# Patient Record
Sex: FEMALE | Race: WHITE | Employment: OTHER | URBAN - METROPOLITAN AREA
[De-identification: names, ages, dates, MRNs, and addresses within clinical notes are randomized per-mention and may not be internally consistent; named-entity substitution may affect disease eponyms.]

---

## 2018-07-20 ENCOUNTER — OFFICE VISIT (OUTPATIENT)
Dept: CARDIOLOGY CLINIC | Age: 83
End: 2018-07-20

## 2018-07-20 VITALS
HEIGHT: 65 IN | RESPIRATION RATE: 18 BRPM | BODY MASS INDEX: 23.82 KG/M2 | OXYGEN SATURATION: 93 % | HEART RATE: 60 BPM | DIASTOLIC BLOOD PRESSURE: 60 MMHG | SYSTOLIC BLOOD PRESSURE: 122 MMHG | WEIGHT: 143 LBS

## 2018-07-20 DIAGNOSIS — I42.2 HYPERTROPHIC CARDIOMYOPATHY (HCC): ICD-10-CM

## 2018-07-20 DIAGNOSIS — E78.00 HYPERCHOLESTEROLEMIA: ICD-10-CM

## 2018-07-20 DIAGNOSIS — R07.9 EXERTIONAL CHEST PAIN: Primary | ICD-10-CM

## 2018-07-20 DIAGNOSIS — R07.9 CHEST PAIN, UNSPECIFIED TYPE: ICD-10-CM

## 2018-07-20 RX ORDER — DILTIAZEM HYDROCHLORIDE 120 MG/1
120 CAPSULE, COATED, EXTENDED RELEASE ORAL DAILY
Qty: 90 CAP | Refills: 1 | Status: SHIPPED | OUTPATIENT
Start: 2018-07-20 | End: 2018-08-09 | Stop reason: SDUPTHER

## 2018-07-20 RX ORDER — DILTIAZEM HYDROCHLORIDE 30 MG/1
30 TABLET, FILM COATED ORAL 2 TIMES DAILY
COMMUNITY
Start: 2018-05-10 | End: 2018-07-20 | Stop reason: ALTCHOICE

## 2018-07-20 RX ORDER — LANOLIN ALCOHOL/MO/W.PET/CERES
400 CREAM (GRAM) TOPICAL DAILY
COMMUNITY
End: 2018-11-26

## 2018-07-20 RX ORDER — ATORVASTATIN CALCIUM 40 MG/1
TABLET, FILM COATED ORAL DAILY
COMMUNITY

## 2018-07-20 RX ORDER — ASPIRIN 81 MG/1
81 TABLET ORAL EVERY OTHER DAY
COMMUNITY

## 2018-07-20 RX ORDER — BISMUTH SUBSALICYLATE 262 MG
1 TABLET,CHEWABLE ORAL DAILY
COMMUNITY

## 2018-07-20 NOTE — PROGRESS NOTES
[address]  [phone] Subjective:  
  
Ramy Shirley is a 80 y.o. female is here for a new patient visit, formerly followed by Dr. Ethan Sierra. She was told she has hypertrophic cardiomyopathy. Dr. Ethan Sierra tried beta-blockers, but it caused multiple side effects as noted in the allergies section. She is currently on diltiazem with some improvement in symptoms, but still has chest pain with activities of daily living at home. She does some strength training type of exercises at home without any symptoms and can go to the store without chest pain. The patient orthopnea, PND, LE edema, palpitations, syncope, or presyncope. Patient Active Problem List  
 Diagnosis Date Noted  Chest pain 07/20/2018  Hypertrophic cardiomyopathy ([address] 07/20/2018  Elevated BP 05/25/2016  Decubitus ulcer of left heel, unstageable ([address] 03/16/2016  Orthostatic hypotension 08/24/2015  Pelvic fracture ([address] 08/22/2015  Breast cancer, stage 1, estrogen receptor positive ([address] 07/23/2014  Hypercholesterolemia  IBS (irritable bowel syndrome)  Hypothyroidism  Neuropathy Neal Helms MD 
Past Medical History:  
Diagnosis Date  Arrhythmia   
 pvc's  Blurred vision  Cancer ([address] BREAST  Coagulation disorder ([address] bleeds easily, and clots slowly.  Decubitus ulcer of left heel, unstageable ([address] 3/16/2016  Diabetes ([address]  GERD (gastroesophageal reflux disease)  Hypercholesterolemia  Hypothyroidism  IBS (irritable bowel syndrome)  Neuropathy Past Surgical History:  
Procedure Laterality Date  HX BLADDER SUSPENSION    
 HX CHOLECYSTECTOMY  HX HEMORRHOIDECTOMY  12/2012; 1/13  
 had two surgeries to repair.  HX ORTHOPAEDIC    
 hip Allergies Allergen Reactions  Adhesive Other (comments)  Amoxicillin Diarrhea Hemorrhoids, no apetite  Atenolol Other (comments) Noise in head and voice in body like a baby crying, seeing black spots  Bactrim [Sulfamethoprim Ds] Hives Family History Problem Relation Age of Onset  Heart Disease Sister  Heart Disease Brother  Cancer Father   
  lung  Cancer Daughter   
  breast  
  
Social History Social History  Marital status:  Spouse name: N/A  
 Number of children: N/A  
 Years of education: N/A Occupational History  Not on file. Social History Main Topics  Smoking status: Former Smoker Packs/day: 0.25 Years: 2.00  Smokeless tobacco: Never Used  Alcohol use No  
 Drug use: No  
 Sexual activity: Not Currently Other Topics Concern  Not on file Social History Narrative Current Outpatient Prescriptions Medication Sig  
 atorvastatin (LIPITOR) 40 mg tablet Take  by mouth daily.  multivitamin (ONE A DAY) tablet Take 1 Tab by mouth daily.  aspirin delayed-release 81 mg tablet Take 81 mg by mouth every other day.  lactase (LACTAID PO) Take  by mouth daily.  magnesium oxide (MAG-OX) 400 mg tablet Take 400 mg by mouth daily.  dilTIAZem CD (CARDIZEM CD) 120 mg ER capsule Take 1 Cap by mouth daily. Replaces short acting diltiazem 7/20/18  pantoprazole (PROTONIX) 40 mg tablet Take 40 mg by mouth daily.  LACTOBAC CMB #3/FOS/PANTETHINE (PROBIOTIC & ACIDOPHILUS PO) Take  by mouth daily.  gabapentin (NEURONTIN) 300 mg capsule TAKE ONE CAPSULE BY MOUTH FOUR TIMES DAILY  levothyroxine (SYNTHROID) 75 mcg tablet Take 1 Tab by mouth Daily (before breakfast).  omega-3 fatty acids-vitamin e (FISH OIL) 1,000 mg cap Take 2 Caps by mouth daily.  CINNAMON BARK (CINNAMON PO) Take 350 mg by mouth daily.  CALCIUM CARBONATE/VITAMIN D3 (CALCIUM WITH VITAMIN D3 PO) Take 1,000 mg by mouth three (3) times daily.  coenzyme q10-vitamin e (COQ10 ) 100-100 mg-unit Cap Take 100 mg by mouth two (2) times a day.   
 vitamin c-vitamin e cap Take by mouth.  CRESTOR 20 mg tablet TAKE 1 TABLET BY MOUTH EVERY AT BEDTIME.  PSYLLIUM HUSK (WAL-MUCIL FIBER PO) Take  by mouth. 6 caps daily No current facility-administered medications for this visit. Review of Symptoms: 
11 systems reviewed, negative other than as stated in the HPI Physical ExamPhysical Exam:   
Vitals:  
 07/20/18 1341 07/20/18 1354 BP: 126/60 122/60 Pulse: 60 Resp: 18 SpO2: 93% Weight: 143 lb (64.9 kg) Height: 5' 5\" (1.651 m) Body mass index is 23.8 kg/(m^2). General PE Gen:  NAD Mental Status - Alert. General Appearance - Not in acute distress. Chest and Lung Exam  
Inspection: Accessory muscles - No use of accessory muscles in breathing. Auscultation:  
Breath sounds: - Normal.  
Cardiovascular Inspection: Jugular vein - Bilateral - Inspection Normal.  
Palpation/Percussion:  
Apical Impulse: - Normal.  
Auscultation: Rhythm - Regular. Heart Sounds - S1 WNL and S2 WNL. No S3 or S4. Murmurs & Other Heart Sounds: Auscultation of the heart reveals - No Murmurs. Peripheral Vascular Upper Extremity: Inspection - Bilateral - No Cyanotic nailbeds or Digital clubbing. Lower Extremity:  
Palpation: Edema - Bilateral - No edema. Abdomen:   Soft, non-tender, bowel sounds are active. Neuro: A&O times 3, CN and motor grossly WNL Labs:  
Lab Results Component Value Date/Time  Cholesterol, total 118 10/09/2015 02:27 PM  
 Cholesterol, total 146 06/09/2015 11:24 AM  
 Cholesterol, total 143 02/28/2014 11:30 AM  
 Cholesterol, total 162 10/23/2013 11:14 AM  
 Cholesterol, total 127 02/05/2013 11:22 AM  
 HDL Cholesterol 35 (L) 10/09/2015 02:27 PM  
 HDL Cholesterol 34 (L) 06/09/2015 11:24 AM  
 HDL Cholesterol 37 (L) 02/28/2014 11:30 AM  
 HDL Cholesterol 35 (L) 10/23/2013 11:14 AM  
 HDL Cholesterol 32 (L) 02/05/2013 11:22 AM  
 LDL, calculated 38 10/09/2015 02:27 PM  
 LDL, calculated 43 06/09/2015 11:24 AM  
 LDL, calculated 46 02/28/2014 11:30 AM  
 LDL, calculated Comment 10/23/2013 11:14 AM  
 LDL, calculated 46 02/05/2013 11:22 AM  
 Triglyceride 227 (H) 10/09/2015 02:27 PM  
 Triglyceride 347 (H) 06/09/2015 11:24 AM  
 Triglyceride 301 (H) 02/28/2014 11:30 AM  
 Triglyceride 419 (H) 10/23/2013 11:14 AM  
 Triglyceride 243 (H) 02/05/2013 11:22 AM  
 CHOL/HDL Ratio 4.8 06/09/2010 05:20 PM  
 
Lab Results Component Value Date/Time CK 89 08/22/2015 04:25 PM  
 
Lab Results Component Value Date/Time Sodium 142 10/09/2015 02:27 PM  
 Potassium 4.6 10/09/2015 02:27 PM  
 Chloride 105 10/09/2015 02:27 PM  
 CO2 19 10/09/2015 02:27 PM  
 Anion gap 9 08/25/2015 03:19 AM  
 Glucose 95 10/09/2015 02:27 PM  
 BUN 14 10/09/2015 02:27 PM  
 Creatinine 0.88 10/09/2015 02:27 PM  
 BUN/Creatinine ratio 16 10/09/2015 02:27 PM  
 GFR est AA 65 10/09/2015 02:27 PM  
 GFR est non-AA 56 (L) 10/09/2015 02:27 PM  
 Calcium 9.8 10/09/2015 02:27 PM  
 Bilirubin, total 0.3 10/09/2015 02:27 PM  
 AST (SGOT) 31 10/09/2015 02:27 PM  
 Alk. phosphatase 110 10/09/2015 02:27 PM  
 Protein, total 6.9 10/09/2015 02:27 PM  
 Albumin 3.8 10/09/2015 02:27 PM  
 Globulin 4.2 (H) 08/22/2015 04:25 PM  
 A-G Ratio 0.8 (L) 08/22/2015 04:25 PM  
 ALT (SGPT) 18 10/09/2015 02:27 PM  
 
 
EKG: 
NSR, LVH with prominent ST and T-wave changes Assessment: 
  
  
1. Exertional chest pain 2. Chest pain, unspecified type 3. Hypercholesterolemia 4. Hypertrophic cardiomyopathy ([address] Orders Placed This Encounter  AMB POC EKG ROUTINE W/ 12 LEADS, INTER & REP Order Specific Question:   Reason for Exam: Answer:   routine  STRESS TEST LEXISCAN/CARDIOLITE Standing Status:   Future Standing Expiration Date:   1/20/2019 Order Specific Question:   Reason for Exam: Answer:   CP and/or SOB  2D ECHO COMPLETE ADULT (TTE) W OR WO CONTR Standing Status:   Future Standing Expiration Date:   1/17/2019   Order Specific Question:   Reason for Exam: Answer:   HF  
 atorvastatin (LIPITOR) 40 mg tablet Sig: Take  by mouth daily.  multivitamin (ONE A DAY) tablet Sig: Take 1 Tab by mouth daily.  DISCONTD: dilTIAZem (CARDIZEM) 30 mg tablet Sig: Take 30 mg by mouth two (2) times a day.  aspirin delayed-release 81 mg tablet Sig: Take 81 mg by mouth every other day.  lactase (LACTAID PO) Sig: Take  by mouth daily.  magnesium oxide (MAG-OX) 400 mg tablet Sig: Take 400 mg by mouth daily.  dilTIAZem CD (CARDIZEM CD) 120 mg ER capsule Sig: Take 1 Cap by mouth daily. Replaces short acting diltiazem 7/20/18 Dispense:  90 Cap Refill:  1 Plan:  
 
Reported hypertrophic cardiomyopathy with chest pain on exertion: 
Intolerant of beta-blockers. Diltiazem seems to help a bit. Try long-acting diltiazem at a higher dose. Check echo and Lexiscan Myoview. Was prescribed isosorbide mononitrate, but decided not to take based on the side effects she read about. Consider Ranexa. She is leaning toward medical management only at the age of 80, but would possibly consider heart catheterization if her stress test were significantly abnormal. 
 
Follow-up next available.  
 
Alvin Cormier MD

## 2018-07-20 NOTE — PROGRESS NOTES
1. Have you been to the ER, urgent care clinic since your last visit? Hospitalized since your last visit? No    2. Have you seen or consulted any other health care providers outside of the [address] [place] [address] since your last visit? Include any pap smears or colon screening. No    Chief Complaint   Patient presents with    Chest Pain     Last seen here 2014. C/O CP with exertion.

## 2018-08-03 ENCOUNTER — CLINICAL SUPPORT (OUTPATIENT)
Dept: CARDIOLOGY CLINIC | Age: 83
End: 2018-08-03

## 2018-08-03 DIAGNOSIS — R07.9 CHEST PAIN, UNSPECIFIED TYPE: ICD-10-CM

## 2018-08-03 DIAGNOSIS — E78.00 HYPERCHOLESTEROLEMIA: ICD-10-CM

## 2018-08-03 DIAGNOSIS — R93.1 ABNORMAL NUCLEAR CARDIAC IMAGING TEST: Primary | ICD-10-CM

## 2018-08-03 DIAGNOSIS — R07.9 EXERTIONAL CHEST PAIN: ICD-10-CM

## 2018-08-03 DIAGNOSIS — I42.2 HYPERTROPHIC CARDIOMYOPATHY (HCC): ICD-10-CM

## 2018-08-07 ENCOUNTER — TELEPHONE (OUTPATIENT)
Dept: CARDIOLOGY CLINIC | Age: 83
End: 2018-08-07

## 2018-08-07 NOTE — TELEPHONE ENCOUNTER
----- Message from Radha Rice MD sent at 8/6/2018  8:26 AM EDT -----  Stress test abnormal. F/u to discuss as Dr. Aron Hess is away.  thx.

## 2018-08-07 NOTE — TELEPHONE ENCOUNTER
Please call to scheduled with NP Dania Stratton or other provider ASAP per Dr Desmond Mcdonnell thanks

## 2018-08-08 NOTE — PROGRESS NOTES
Normal pumping heart strength. Known HOCM. Moderate valve problems. Can be discussed during her appointment.

## 2018-08-09 ENCOUNTER — OFFICE VISIT (OUTPATIENT)
Dept: CARDIOLOGY CLINIC | Age: 83
End: 2018-08-09

## 2018-08-09 VITALS
WEIGHT: 144.5 LBS | DIASTOLIC BLOOD PRESSURE: 72 MMHG | SYSTOLIC BLOOD PRESSURE: 126 MMHG | RESPIRATION RATE: 18 BRPM | BODY MASS INDEX: 24.07 KG/M2 | HEIGHT: 65 IN | OXYGEN SATURATION: 97 % | HEART RATE: 120 BPM

## 2018-08-09 DIAGNOSIS — I42.2 HYPERTROPHIC CARDIOMYOPATHY (HCC): Primary | ICD-10-CM

## 2018-08-09 DIAGNOSIS — E78.00 HYPERCHOLESTEROLEMIA: ICD-10-CM

## 2018-08-09 RX ORDER — DILTIAZEM HYDROCHLORIDE 180 MG/1
180 CAPSULE, COATED, EXTENDED RELEASE ORAL DAILY
Qty: 30 CAP | Refills: 1 | Status: SHIPPED | OUTPATIENT
Start: 2018-08-09 | End: 2018-08-21 | Stop reason: ALTCHOICE

## 2018-08-09 NOTE — MR AVS SNAPSHOT
[address] [facility] 
[phone] Patient: Josie Lloyd MRN:  [id number] Visit Information Date & Time Provider Department Dept. Phone Encounter #  
 8/9/2018 11:30 AM Eugenia Silva NP [facility] [phone] Your Appointments 9/17/2018  3:00 PM  
ESTABLISHED PATIENT with Av Cisneros MD  
[facility] [address] [place] [address]) Appt Note: $0CP 7/20/18ksr / test results; Per Rylee Serrano, one month f/u with Dr. Kiera Santizo [address]  
[phone] [address] Upcoming Health Maintenance Date Due  
 FOOT EXAM Q1 4/2/1931 DTaP/Tdap/Td series (1 - Tdap) 4/2/1942 ZOSTER VACCINE AGE 60> 2/2/1981 Bone Densitometry (Dexa) Screening 4/2/1986 Pneumococcal 65+ High/Highest Risk (1 of 2 - PCV13) 4/2/1986 HEMOGLOBIN A1C Q6M 4/9/2016 MICROALBUMIN Q1 6/9/2016 EYE EXAM RETINAL OR DILATED Q1 8/6/2016 LIPID PANEL Q1 10/9/2016 GLAUCOMA SCREENING Q2Y 8/6/2017 MEDICARE YEARLY EXAM 3/14/2018 Influenza Age 5 to Adult 8/1/2018 Allergies as of 8/9/2018  Review Complete On: 8/9/2018 By: Maxwell Stapleton LPN Severity Noted Reaction Type Reactions Adhesive High 08/04/2014   Intolerance Other (comments) Amoxicillin High 04/29/2011    Diarrhea Hemorrhoids, no apetite Atenolol  07/20/2018    Other (comments) Noise in head and voice in body like a baby crying, seeing black spots Bactrim [Sulfamethoprim Ds]  03/16/2016    Hives Current Immunizations  Reviewed on 8/27/2015 Name Date Influenza Vaccine 10/9/2015 Not reviewed this visit You Were Diagnosed With   
  
 Codes Comments Hypercholesterolemia    -  Primary ICD-10-CM: E78.00 ICD-9-CM: 272.0 Vitals BP Pulse Resp Height(growth percentile) Weight(growth percentile) SpO2  
 126/72 (BP 1 Location: Right arm, BP Patient Position: Sitting) (!) 120 18 5' 5\" (1.651 m) 144 lb 8 oz (65.5 kg) 97% BMI OB Status Smoking Status 24.05 kg/m2 Postmenopausal Former Smoker Vitals History BMI and BSA Data Body Mass Index Body Surface Area 24.05 kg/m 2 1.73 m 2 Preferred Pharmacy Pharmacy Name Phone [facility] [phone] [place], [address] AT [place] [address] [phone] Your Updated Medication List  
  
   
This list is accurate as of 8/9/18 12:11 PM.  Always use your most recent med list.  
  
  
  
  
 aspirin delayed-release 81 mg tablet Take 81 mg by mouth every other day. atorvastatin 40 mg tablet Commonly known as:  LIPITOR Take  by mouth daily. CALCIUM WITH VITAMIN D3 PO Take 1,000 mg by mouth Three (3) times a week. CINNAMON PO Take 350 mg by mouth daily. COQ10  100-100 mg-unit Cap Generic drug:  coenzyme q10-vitamin e Take 100 mg by mouth two (2) times a day. dilTIAZem  mg ER capsule Commonly known as:  CARDIZEM CD Take 1 Cap by mouth daily. Replaces short acting diltiazem 7/20/18 FISH OIL 1,000 mg Cap Generic drug:  omega-3 fatty acids-vitamin e Take 2 Caps by mouth daily. gabapentin 300 mg capsule Commonly known as:  NEURONTIN  
TAKE ONE CAPSULE BY MOUTH FOUR TIMES DAILY  
  
 LACTAID PO Take  by mouth as needed. levothyroxine 75 mcg tablet Commonly known as:  SYNTHROID Take 1 Tab by mouth Daily (before breakfast). magnesium oxide 400 mg tablet Commonly known as:  MAG-OX Take 400 mg by mouth daily. multivitamin tablet Commonly known as:  ONE A DAY Take 1 Tab by mouth daily. pantoprazole 40 mg tablet Commonly known as:  PROTONIX Take 40 mg by mouth daily.   
  
 PROBIOTIC & ACIDOPHILUS PO  
 Take  by mouth daily. vitamin c-vitamin e Cap Take  by mouth. WAL-MUCIL FIBER PO Take  by mouth. 6 caps daily We Performed the Following AMB POC EKG ROUTINE W/ 12 LEADS, INTER & REP [47364 CPT(R)] Introducing [facility] SERVICES! [employer] introduces [employer] patient portal. Now you can access parts of your medical record, email your doctor's office, and request medication refills online. 1. In your internet browser, go to [email or web address] 2. Click on the First Time User? Click Here link in the Sign In box. You will see the New Member Sign Up page. 3. Enter your [employer] Access Code exactly as it appears below. You will not need to use this code after youve completed the sign-up process. If you do not sign up before the expiration date, you must request a new code. · [employer] Access Code: [email or web address] Expires: 10/18/2018  1:37 PM 
 
4. Enter the last four digits of your Social Security Number (xxxx) and Date of Birth (mm/dd/yyyy) as indicated and click Submit. You will be taken to the next sign-up page. 5. Create a [employer] ID. This will be your [employer] login ID and cannot be changed, so think of one that is secure and easy to remember. 6. Create a [employer] password. You can change your password at any time. 7. Enter your Password Reset Question and Answer. This can be used at a later time if you forget your password. 8. Enter your e-mail address. You will receive e-mail notification when new information is available in [address] 9. Click Sign Up. You can now view and download portions of your medical record. 10. Click the Download Summary menu link to download a portable copy of your medical information. If you have questions, please visit the Frequently Asked Questions section of the [employer] website. Remember, [employer] is NOT to be used for urgent needs. For medical emergencies, dial 911. Now available from your iPhone and Android! Please provide this summary of care documentation to your next provider. Your primary care clinician is listed as Ankur Dowd. If you have any questions after today's visit, please call [phone].

## 2018-08-09 NOTE — LETTER
8/9/2018 12:46 PM 
 
Patient:  Melanie Dick YOB: 1921 Date of Visit: 8/9/2018 Dear Desean Rhodes MD 
[place] [address] VIA Facsimile: [phone] 
 : Thank you for referring Ms. Earl Stephens to me for evaluation/treatment. Below are the relevant portions of my assessment and plan of care. If you have questions, please do not hesitate to call me. I look forward to following Ms. Katheren Dakins along with you. Sincerely, Pancho Carreno NP

## 2018-08-09 NOTE — PROGRESS NOTES
Ann-Marie Mendoza DNP, ANP-BC  Subjective/HPI:     Xavier Pickett is a 80 y.o. female is here for test results follow-up. She has a history of HOCM. She is here to review her echocardiogram and stress test results. Patient is accompanied by HER-2 daughters. Patient denies exertional chest pain or limiting dyspnea on exertion, she reports in the morning when she is getting washed and dressed if she does not take her time she feels fatigued however she is able to walk with a rolling walker and perform her daily exercise routine which involves a floor peddling device without symptoms. At last appointment she was changed from immediate release to extended release diltiazem. Nuclear stress test:    Impression:   Myocardial perfusion imaging is abnormal: there is a large area of infarction in the infero-lateral wall. Overall left ventricular systolic function was abnormal: with regional wall motion abnormalities (as noted above). No previous study to compare. These test results indicate high likelihood for the presence of angiographically significant coronary artery disease.         Echocardiogram:  SUMMARY:  Left ventricle: The cavity was small. Systolic function was vigorous. Ejection fraction was estimated in the range of 65 % to 70 %. Wall  thickness was markedly increased. There was severe asymmetric hypertrophy. There was dynamic obstruction in the outflow tract, with a peak gradient  of 140 mmHg. Left atrium: The atrium was mildly dilated. Mitral valve: There was moderate annular calcification. There was moderate  regurgitation. Aortic valve: Leaflets exhibited sclerosis    PCP Provider  Ina Serrano MD  Past Medical History:   Diagnosis Date    Arrhythmia     pvc's    Blurred vision     Cancer ([address]     BREAST    Coagulation disorder (HCC)     bleeds easily, and clots slowly.     Decubitus ulcer of left heel, unstageable ([address] 3/16/2016    Diabetes ([address]     GERD (gastroesophageal reflux disease)     Hypercholesterolemia     Hypothyroidism     IBS (irritable bowel syndrome)     Neuropathy       Past Surgical History:   Procedure Laterality Date    HX BLADDER SUSPENSION      HX CHOLECYSTECTOMY      HX HEMORRHOIDECTOMY  12/2012; 1/13    had two surgeries to repair.  HX ORTHOPAEDIC      hip     Allergies   Allergen Reactions    Adhesive Other (comments)    Amoxicillin Diarrhea     Hemorrhoids, no apetite    Atenolol Other (comments)     Noise in head and voice in body like a baby crying, seeing black spots    Bactrim [Sulfamethoprim Ds] Hives      Family History   Problem Relation Age of Onset    Heart Disease Sister     Heart Disease Brother     Cancer Father      lung    Cancer Daughter      breast      Current Outpatient Prescriptions   Medication Sig    dilTIAZem CD (CARDIZEM CD) 180 mg ER capsule Take 1 Cap by mouth daily. Increased 8/9/18    atorvastatin (LIPITOR) 40 mg tablet Take  by mouth daily.  multivitamin (ONE A DAY) tablet Take 1 Tab by mouth daily.  aspirin delayed-release 81 mg tablet Take 81 mg by mouth every other day.  lactase (LACTAID PO) Take  by mouth as needed.  magnesium oxide (MAG-OX) 400 mg tablet Take 400 mg by mouth daily.  pantoprazole (PROTONIX) 40 mg tablet Take 40 mg by mouth daily.  LACTOBAC CMB #3/FOS/PANTETHINE (PROBIOTIC & ACIDOPHILUS PO) Take  by mouth daily.  gabapentin (NEURONTIN) 300 mg capsule TAKE ONE CAPSULE BY MOUTH FOUR TIMES DAILY    levothyroxine (SYNTHROID) 75 mcg tablet Take 1 Tab by mouth Daily (before breakfast).  omega-3 fatty acids-vitamin e (FISH OIL) 1,000 mg cap Take 2 Caps by mouth daily.  CINNAMON BARK (CINNAMON PO) Take 350 mg by mouth daily.  CALCIUM CARBONATE/VITAMIN D3 (CALCIUM WITH VITAMIN D3 PO) Take 1,000 mg by mouth Three (3) times a week.  coenzyme q10-vitamin e (COQ10 ) 100-100 mg-unit Cap Take 100 mg by mouth two (2) times a day.     vitamin c-vitamin e cap Take  by mouth.  PSYLLIUM HUSK (WAL-MUCIL FIBER PO) Take  by mouth. 6 caps daily     No current facility-administered medications for this visit. Vitals:    08/09/18 1116 08/09/18 1129   BP: 130/70 126/72   Pulse: (!) 120    Resp: 18    SpO2: 97%    Weight: 144 lb 8 oz (65.5 kg)    Height: 5' 5\" (1.651 m)      Social History     Social History    Marital status:      Spouse name: N/A    Number of children: N/A    Years of education: N/A     Occupational History    Not on file. Social History Main Topics    Smoking status: Former Smoker     Packs/day: 0.25     Years: 2.00    Smokeless tobacco: Never Used    Alcohol use No    Drug use: No    Sexual activity: Not Currently     Other Topics Concern    Not on file     Social History Narrative       I have reviewed the nurses notes, vitals, problem list, allergy list, medical history, family, social history and medications. Review of Symptoms:    General: Pt denies excessive weight gain or loss. Pt is able to conduct ADL's  HEENT: Denies blurred vision, headaches, epistaxis and difficulty swallowing. Respiratory: Denies shortness of breath, HICKEY, wheezing or stridor. Cardiovascular: Denies precordial pain, palpitations, edema or PND  Gastrointestinal: Denies poor appetite, indigestion, abdominal pain or blood in stool  Musculoskeletal: Denies pain or swelling from muscles or joints  Neurologic: Denies tremor, paresthesias, or sensory motor disturbance  Skin: Denies rash, itching or texture change. Physical Exam:      General: Well developed, in no acute distress, cooperative and alert  HEENT: No carotid bruits, no JVD, trach is midline. Neck Supple,   Heart:  Normal S1/S2 negative S3 or S4. Regular, 2/6 holosystolic murmur  Respiratory: Clear bilaterally x 4, no wheezing or rales  Abdomen:   Soft, non-tender, no masses, bowel sounds are active.   Extremities:  No edema, normal cap refill, no cyanosis, atraumatic.    Neuro: A&Ox3, speech clear, gait stable. Skin: Skin color is normal. No rashes or lesions. Non diaphoretic  Vascular: 2+ pulses symmetric in all extremities    Cardiographics    ECG: Sinus tachycardia L VH criteria  Results for orders placed or performed during the hospital encounter of 08/22/15   EKG, 12 LEAD, INITIAL   Result Value Ref Range    Ventricular Rate 83 BPM    Atrial Rate 83 BPM    P-R Interval 172 ms    QRS Duration 78 ms    Q-T Interval 370 ms    QTC Calculation (Bezet) 434 ms    Calculated P Axis 59 degrees    Calculated R Axis 43 degrees    Calculated T Axis 155 degrees    Diagnosis       Normal sinus rhythm  Left ventricular hypertrophy with repolarization abnormality  When compared with ECG of 16-JUN-2015 10:19,  No significant change was found Except  for rate    Confirmed by Loreta Sanabria ([id number]) on 8/23/2015 3:23:36 PM           Cardiology Labs:  Lab Results   Component Value Date/Time    Cholesterol, total 118 10/09/2015 02:27 PM    HDL Cholesterol 35 (L) 10/09/2015 02:27 PM    LDL, calculated 38 10/09/2015 02:27 PM    Triglyceride 227 (H) 10/09/2015 02:27 PM    CHOL/HDL Ratio 4.8 06/09/2010 05:20 PM       Lab Results   Component Value Date/Time    Sodium 142 10/09/2015 02:27 PM    Potassium 4.6 10/09/2015 02:27 PM    Chloride 105 10/09/2015 02:27 PM    CO2 19 10/09/2015 02:27 PM    Anion gap 9 08/25/2015 03:19 AM    Glucose 95 10/09/2015 02:27 PM    BUN 14 10/09/2015 02:27 PM    Creatinine 0.88 10/09/2015 02:27 PM    BUN/Creatinine ratio 16 10/09/2015 02:27 PM    GFR est AA 65 10/09/2015 02:27 PM    GFR est non-AA 56 (L) 10/09/2015 02:27 PM    Calcium 9.8 10/09/2015 02:27 PM    Bilirubin, total 0.3 10/09/2015 02:27 PM    AST (SGOT) 31 10/09/2015 02:27 PM    Alk.  phosphatase 110 10/09/2015 02:27 PM    Protein, total 6.9 10/09/2015 02:27 PM    Albumin 3.8 10/09/2015 02:27 PM    Globulin 4.2 (H) 08/22/2015 04:25 PM    A-G Ratio 0.8 (L) 08/22/2015 04:25 PM    ALT (SGPT) 18 10/09/2015 02:27 PM Assessment:     Assessment:     Diagnoses and all orders for this visit:    1. Hypertrophic cardiomyopathy ([address]    2. Hypercholesterolemia  -     AMB POC EKG ROUTINE W/ 12 LEADS, INTER & REP    Other orders  -     dilTIAZem CD (CARDIZEM CD) 180 mg ER capsule; Take 1 Cap by mouth daily. Increased 8/9/18        ICD-10-CM ICD-9-CM    1. Hypertrophic cardiomyopathy (HCC) I42.2 425.18    2. Hypercholesterolemia E78.00 272.0 AMB POC EKG ROUTINE W/ 12 LEADS, INTER & REP     Orders Placed This Encounter    AMB POC EKG ROUTINE W/ 12 LEADS, INTER & REP     Order Specific Question:   Reason for Exam:     Answer:   routine    dilTIAZem CD (CARDIZEM CD) 180 mg ER capsule     Sig: Take 1 Cap by mouth daily. Increased 8/9/18     Dispense:  30 Cap     Refill:  1        Plan:     Patient is a 60-year-old female who presents for review of test results. Echocardiogram shows normal pumping strength ejection fraction 65% of note nuclear stress test had falsely indicated at 35%. She has known HOCM her peak gradient is 140 mmHg. She presents tachycardic I will increase diltiazem from 120 mg to 180 mg daily. On nuclear stress test there is no reversible ischemia however there is a fixed defect. In reviewing hospital nuclear stress test in 2014 there was not a fixed defect. Ms. Yves Gracia at 80 is able to perform her activities of daily life without limiting dyspnea on exertion or chest pain. She states she exercises regularly, walks with a rolling walker without difficulty. We will have patient follow-up in 1 month, reassess EKG heart rate, reassess symptoms, no indication at this time for coronary angiography as she is asymptomatic of CAD. Follow-up with Dr. Evan Carrington in 1 month  Craven Cogan, NP    This note was created using voice recognition software. Despite editing, there may be syntax errors.

## 2018-08-09 NOTE — PROGRESS NOTES
1. Have you been to the ER, urgent care clinic since your last visit? Hospitalized since your last visit? NO    2. Have you seen or consulted any other health care providers outside of the [address] [place] [address] since your last visit? Include any pap smears or colon screening. NO    Results. C/O HEAVINESS IN CHEST OFF AND ON, SWELLING IN BLE.

## 2018-08-21 RX ORDER — DILTIAZEM HYDROCHLORIDE 60 MG/1
60 TABLET, FILM COATED ORAL 3 TIMES DAILY
Qty: 90 TAB | Refills: 3 | Status: SHIPPED | OUTPATIENT
Start: 2018-08-21 | End: 2019-01-04 | Stop reason: SDUPTHER

## 2018-11-26 ENCOUNTER — OFFICE VISIT (OUTPATIENT)
Dept: CARDIOLOGY CLINIC | Age: 83
End: 2018-11-26

## 2018-11-26 VITALS
HEIGHT: 65 IN | RESPIRATION RATE: 20 BRPM | OXYGEN SATURATION: 93 % | DIASTOLIC BLOOD PRESSURE: 70 MMHG | BODY MASS INDEX: 23.82 KG/M2 | WEIGHT: 143 LBS | SYSTOLIC BLOOD PRESSURE: 132 MMHG | HEART RATE: 117 BPM

## 2018-11-26 DIAGNOSIS — I42.2 HYPERTROPHIC CARDIOMYOPATHY (HCC): Primary | ICD-10-CM

## 2018-11-26 DIAGNOSIS — R07.89 OTHER CHEST PAIN: ICD-10-CM

## 2018-11-26 DIAGNOSIS — R06.09 DOE (DYSPNEA ON EXERTION): ICD-10-CM

## 2018-11-26 DIAGNOSIS — E78.00 HYPERCHOLESTEROLEMIA: ICD-10-CM

## 2018-11-26 RX ORDER — NITROGLYCERIN 0.4 MG/1
0.4 TABLET SUBLINGUAL
Qty: 25 TAB | Refills: 1 | Status: SHIPPED | OUTPATIENT
Start: 2018-11-26 | End: 2021-05-12

## 2018-11-26 RX ORDER — RANOLAZINE 500 MG/1
500 TABLET, EXTENDED RELEASE ORAL 2 TIMES DAILY
Qty: 60 TAB | Refills: 3 | Status: SHIPPED | OUTPATIENT
Start: 2018-11-26 | End: 2020-05-27 | Stop reason: SDUPTHER

## 2018-11-26 NOTE — PROGRESS NOTES
[address], [address] [facility]  [phone]     Subjective:      Cony Lopez is a 80 y.o. female is here for symptom based visit. Reports unchanged exertional chest pain associated with sob when she exerts around the house. It takes her 3 hrs go get dressed because upper body movement, more especially, cause her to chest pain to recur and she gets shortwinded. She states this is the same old story that has not and her symptoms always resolve immediately with rest.  Then she can go out and do her daily activities without any chest discomfort. The patient denies orthopnea, PND, LE edema, palpitations, syncope, or presyncope. Patient Active Problem List    Diagnosis Date Noted    HICKEY (dyspnea on exertion) 11/26/2018    Chest pain 07/20/2018    Hypertrophic cardiomyopathy ([address] 07/20/2018    Elevated BP 05/25/2016    Decubitus ulcer of left heel, unstageable ([address] 03/16/2016    Orthostatic hypotension 08/24/2015    Pelvic fracture (HCC) 08/22/2015    Breast cancer, stage 1, estrogen receptor positive ([address] 07/23/2014    Hypercholesterolemia     IBS (irritable bowel syndrome)     Hypothyroidism     Neuropathy       Basilio Romo MD  Past Medical History:   Diagnosis Date    Arrhythmia     pvc's    Blurred vision     Cancer ([address]     BREAST    Coagulation disorder (HCC)     bleeds easily, and clots slowly.  Decubitus ulcer of left heel, unstageable ([address] 3/16/2016    Diabetes ([address]     GERD (gastroesophageal reflux disease)     Hypercholesterolemia     Hypothyroidism     IBS (irritable bowel syndrome)     Neuropathy       Past Surgical History:   Procedure Laterality Date    HX BLADDER SUSPENSION      HX CHOLECYSTECTOMY      HX HEMORRHOIDECTOMY  12/2012; 1/13    had two surgeries to repair.     HX ORTHOPAEDIC      hip     Allergies   Allergen Reactions    Adhesive Other (comments)    Amoxicillin Diarrhea     Hemorrhoids, no apetite    Atenolol Other (comments)     Noise in head and voice in body like a baby crying, seeing black spots    Bactrim [Sulfamethoprim Ds] Hives    Imdur [Isosorbide Mononitrate] Other (comments)     Headache and nightmares      Family History   Problem Relation Age of Onset    Heart Disease Sister     Heart Disease Brother     Cancer Father         lung    Cancer Daughter         breast      Social History     Socioeconomic History    Marital status:      Spouse name: Not on file    Number of children: Not on file    Years of education: Not on file    Highest education level: Not on file   Social Needs    Financial resource strain: Not on file    Food insecurity - worry: Not on file    Food insecurity - inability: Not on file   Smash Bucket needs - medical: Not on file   Smash Bucket needs - non-medical: Not on file   Occupational History    Not on file   Tobacco Use    Smoking status: Former Smoker     Packs/day: 0.25     Years: 2.00     Pack years: 0.50    Smokeless tobacco: Never Used   Substance and Sexual Activity    Alcohol use: No    Drug use: No    Sexual activity: Not Currently   Other Topics Concern    Not on file   Social History Narrative    Not on file      Current Outpatient Medications   Medication Sig    ranolazine ER (RANEXA) 500 mg SR tablet Take 1 Tab by mouth two (2) times a day.  nitroglycerin (NITROSTAT) 0.4 mg SL tablet 1 Tab by SubLINGual route every five (5) minutes as needed (call 911 if not relieved by 3).  dilTIAZem (CARDIZEM) 60 mg tablet Take 1 Tab by mouth three (3) times daily. Intolerant to extended release.  atorvastatin (LIPITOR) 40 mg tablet Take  by mouth daily.  multivitamin (ONE A DAY) tablet Take 1 Tab by mouth daily.  aspirin delayed-release 81 mg tablet Take 81 mg by mouth every other day.  lactase (LACTAID PO) Take  by mouth as needed.  pantoprazole (PROTONIX) 40 mg tablet Take 40 mg by mouth daily.     LACTOBAC CMB #3/FOS/PANTETHINE (PROBIOTIC & ACIDOPHILUS PO) Take  by mouth daily.  gabapentin (NEURONTIN) 300 mg capsule TAKE ONE CAPSULE BY MOUTH FOUR TIMES DAILY    levothyroxine (SYNTHROID) 75 mcg tablet Take 1 Tab by mouth Daily (before breakfast).  PSYLLIUM HUSK (WAL-MUCIL FIBER PO) Take  by mouth as needed.  omega-3 fatty acids-vitamin e (FISH OIL) 1,000 mg cap Take 2 Caps by mouth daily.  CINNAMON BARK (CINNAMON PO) Take 350 mg by mouth daily.  CALCIUM CARBONATE/VITAMIN D3 (CALCIUM WITH VITAMIN D3 PO) Take 1,000 mg by mouth Three (3) times a week.  coenzyme q10-vitamin e (COQ10 ) 100-100 mg-unit Cap Take 100 mg by mouth two (2) times a day. No current facility-administered medications for this visit. Review of Symptoms:  11 systems reviewed, negative other than as stated in the HPI    Physical ExamPhysical Exam:    Vitals:    11/26/18 1501 11/26/18 1502 11/26/18 1547 11/26/18 1549   BP: 160/70 160/70 134/72 132/70   Pulse: (!) 117      Resp: 20      SpO2: 93%      Weight: 143 lb (64.9 kg)      Height: 5' 5\" (1.651 m)        Body mass index is 23.8 kg/m². General PE   Gen:  NAD  Mental Status - Alert. General Appearance - Not in acute distress. Chest and Lung Exam   Inspection: Accessory muscles - No use of accessory muscles in breathing. Auscultation:   Breath sounds: - Normal.   Cardiovascular   Inspection: Jugular vein - Bilateral - Inspection Normal.   Palpation/Percussion:   Apical Impulse: - Normal.   Auscultation: Rhythm - Regular. Heart Sounds - S1 WNL and S2 WNL. No S3 or S4. Murmurs & Other Heart Sounds: Auscultation of the heart reveals - 3/6 MAKEDA  Peripheral Vascular   Upper Extremity: Inspection - Bilateral - No Cyanotic nailbeds or Digital clubbing. Lower Extremity:   Palpation: Edema - Bilateral - No edema. Abdomen:   Soft, non-tender, bowel sounds are active.   Neuro: A&O times 3, CN and motor grossly WNL    Labs:   Lab Results   Component Value Date/Time Cholesterol, total 118 10/09/2015 02:27 PM    Cholesterol, total 146 06/09/2015 11:24 AM    Cholesterol, total 143 02/28/2014 11:30 AM    Cholesterol, total 162 10/23/2013 11:14 AM    Cholesterol, total 127 02/05/2013 11:22 AM    HDL Cholesterol 35 (L) 10/09/2015 02:27 PM    HDL Cholesterol 34 (L) 06/09/2015 11:24 AM    HDL Cholesterol 37 (L) 02/28/2014 11:30 AM    HDL Cholesterol 35 (L) 10/23/2013 11:14 AM    HDL Cholesterol 32 (L) 02/05/2013 11:22 AM    LDL, calculated 38 10/09/2015 02:27 PM    LDL, calculated 43 06/09/2015 11:24 AM    LDL, calculated 46 02/28/2014 11:30 AM    LDL, calculated Comment 10/23/2013 11:14 AM    LDL, calculated 46 02/05/2013 11:22 AM    Triglyceride 227 (H) 10/09/2015 02:27 PM    Triglyceride 347 (H) 06/09/2015 11:24 AM    Triglyceride 301 (H) 02/28/2014 11:30 AM    Triglyceride 419 (H) 10/23/2013 11:14 AM    Triglyceride 243 (H) 02/05/2013 11:22 AM    CHOL/HDL Ratio 4.8 06/09/2010 05:20 PM     Lab Results   Component Value Date/Time    CK 89 08/22/2015 04:25 PM     Lab Results   Component Value Date/Time    Sodium 142 10/09/2015 02:27 PM    Potassium 4.6 10/09/2015 02:27 PM    Chloride 105 10/09/2015 02:27 PM    CO2 19 10/09/2015 02:27 PM    Anion gap 9 08/25/2015 03:19 AM    Glucose 95 10/09/2015 02:27 PM    BUN 14 10/09/2015 02:27 PM    Creatinine 0.88 10/09/2015 02:27 PM    BUN/Creatinine ratio 16 10/09/2015 02:27 PM    GFR est AA 65 10/09/2015 02:27 PM    GFR est non-AA 56 (L) 10/09/2015 02:27 PM    Calcium 9.8 10/09/2015 02:27 PM    Bilirubin, total 0.3 10/09/2015 02:27 PM    AST (SGOT) 31 10/09/2015 02:27 PM    Alk. phosphatase 110 10/09/2015 02:27 PM    Protein, total 6.9 10/09/2015 02:27 PM    Albumin 3.8 10/09/2015 02:27 PM    Globulin 4.2 (H) 08/22/2015 04:25 PM    A-G Ratio 0.8 (L) 08/22/2015 04:25 PM    ALT (SGPT) 18 10/09/2015 02:27 PM       EKG:       Assessment:     Assessment:      1. Hypertrophic cardiomyopathy ([address]    2. Hypercholesterolemia    3.  Other chest pain 4. HICKEY (dyspnea on exertion)        Orders Placed This Encounter    AMB POC EKG ROUTINE W/ 12 LEADS, INTER & REP     Order Specific Question:   Reason for Exam:     Answer:   routine    ranolazine ER (RANEXA) 500 mg SR tablet     Sig: Take 1 Tab by mouth two (2) times a day. Dispense:  60 Tab     Refill:  3    nitroglycerin (NITROSTAT) 0.4 mg SL tablet     Si Tab by SubLINGual route every five (5) minutes as needed (call 911 if not relieved by 3). Dispense:  25 Tab     Refill:  1        Plan: This is a 80 y.o. female is here for symptom based visit. Reports unchanged exertional chest pain associated with sob when she exerts around the house. It takes her 3 hrs go get dressed because upper body movement, more especially, cause her to chest pain to recur and she gets shortwinded. Add Ranexa and sublingual nitroglycerin as needed. Discussed cardiac catheterization as a backup plan if her symptoms are persistent or progressive. She wants to try to avoid this if possible at the age of 80. The patient knows to go to the ED if any progression of symptoms or symptoms not relieved immediately by rest/ NTG. Exertional chest pain, HICKEY  Nuclear stress test  showed large area of infarction in the infero-lateral wall. On ASA, statin, CCB  Did not tolerate Imdur previously. Will start Ranexa 500 mg BID    HTN  Controlled with current therapy    HOCM  Echocardiogram shows normal pumping strength ejection fraction 65% of note nuclear stress test had falsely indicated at 35%. She has known HOCM her peak gradient is 140 mmHg. Tolerating increased dose of Diltiazem 60 mg Diltiazem three times a day    HLD  On statin  Lipids and labs followed by PCP    Follow-up in 3 weeks for reassessment.     Jhon Cardenas MD

## 2018-11-26 NOTE — PROGRESS NOTES
1. Have you been to the ER, urgent care clinic since your last visit? Hospitalized since your last visit? No    2. Have you seen or consulted any other health care providers outside of the [address] [place] [address] since your last visit? Include any pap smears or colon screening. No     Pt continues to complain of rapid heart rate, chest pain and dyspnea with any exertion.

## 2018-11-27 ENCOUNTER — TELEPHONE (OUTPATIENT)
Dept: CARDIOLOGY CLINIC | Age: 83
End: 2018-11-27

## 2018-11-28 NOTE — TELEPHONE ENCOUNTER
Spoke with Nayeli Sanz on [address] [place] [address] St Verified patient with two identifiers. Will  application form for [employer] Co-pay $ 135.00 monthly .

## 2019-01-04 RX ORDER — DILTIAZEM HYDROCHLORIDE 60 MG/1
TABLET, FILM COATED ORAL
Qty: 90 TAB | Refills: 0 | Status: SHIPPED | OUTPATIENT
Start: 2019-01-04 | End: 2019-02-04 | Stop reason: SDUPTHER

## 2019-02-04 RX ORDER — DILTIAZEM HYDROCHLORIDE 60 MG/1
TABLET, FILM COATED ORAL
Qty: 90 TAB | Refills: 0 | Status: SHIPPED | OUTPATIENT
Start: 2019-02-04

## 2019-06-12 ENCOUNTER — HOSPITAL ENCOUNTER (OUTPATIENT)
Dept: GENERAL RADIOLOGY | Age: 84
Discharge: HOME | End: 2019-06-12
Attending: INTERNAL MEDICINE
Payer: MEDICARE

## 2019-06-12 DIAGNOSIS — M25.532 LEFT WRIST PAIN: ICD-10-CM

## 2019-06-12 PROCEDURE — 73110 X-RAY EXAM OF WRIST: CPT

## 2020-05-27 ENCOUNTER — VIRTUAL VISIT (OUTPATIENT)
Dept: CARDIOLOGY CLINIC | Age: 85
End: 2020-05-27

## 2020-05-27 VITALS
WEIGHT: 137 LBS | HEART RATE: 62 BPM | DIASTOLIC BLOOD PRESSURE: 44 MMHG | HEIGHT: 65 IN | BODY MASS INDEX: 22.82 KG/M2 | SYSTOLIC BLOOD PRESSURE: 112 MMHG

## 2020-05-27 DIAGNOSIS — E78.2 MIXED HYPERLIPIDEMIA: ICD-10-CM

## 2020-05-27 DIAGNOSIS — R07.89 OTHER CHEST PAIN: ICD-10-CM

## 2020-05-27 DIAGNOSIS — I42.2 HYPERTROPHIC CARDIOMYOPATHY (HCC): Primary | ICD-10-CM

## 2020-05-27 DIAGNOSIS — Z01.810 ENCOUNTER FOR PRE-OPERATIVE CARDIOVASCULAR CLEARANCE: ICD-10-CM

## 2020-05-27 DIAGNOSIS — R06.09 DOE (DYSPNEA ON EXERTION): ICD-10-CM

## 2020-05-27 RX ORDER — HYDROCORTISONE ACETATE PRAMOXINE HCL 2.5; 1 G/100G; G/100G
CREAM TOPICAL
COMMUNITY
Start: 2020-05-13

## 2020-05-27 RX ORDER — RANOLAZINE 500 MG/1
500 TABLET, EXTENDED RELEASE ORAL 2 TIMES DAILY
Qty: 60 TAB | Refills: 3 | Status: SHIPPED | OUTPATIENT
Start: 2020-05-27 | End: 2021-05-12 | Stop reason: SINTOL

## 2020-05-27 NOTE — PROGRESS NOTES
Chief Complaint   Patient presents with    Follow-up     Hypertrophic cardiomyopathy        1. Have you been to the ER, urgent care clinic since your last visit? Hospitalized since your last visit? No    2. Have you seen or consulted any other health care providers outside of the [address] [place] [address] since your last visit? Include any pap smears or colon screening. Yes Dr. Leslye Alvarez due to Hemorrhoids. Patient C/O chest pressure on exertion. Patient has not bleeding from hemorrhoids off ASA at this time.

## 2020-05-27 NOTE — PROGRESS NOTES
Milady Sweeney is a 80 y.o. female who was seen by synchronous (real-time) audio-video technology on 5/27/2020     [address] [place] [address]  [phone]     Subjective:      Milady Sweeney is a 80 y.o. female is here for routine f/u. The patient denies chest pain/ shortness of breath, orthopnea, PND, LE edema, palpitations, syncope, or presyncope. Her main complaint is moderate bleeding hemorrhoids, and she would like to get surgery for this if safe from a cardiac standpoint. Patient Active Problem List    Diagnosis Date Noted    HICKEY (dyspnea on exertion) 11/26/2018    Chest pain 07/20/2018    Hypertrophic cardiomyopathy ([address] 07/20/2018    Elevated BP 05/25/2016    Decubitus ulcer of left heel, unstageable ([address] 03/16/2016    Orthostatic hypotension 08/24/2015    Pelvic fracture (HCC) 08/22/2015    Breast cancer, stage 1, estrogen receptor positive ([address] 07/23/2014    Hypercholesterolemia     IBS (irritable bowel syndrome)     Hypothyroidism     Neuropathy       Ira Owen MD  Past Medical History:   Diagnosis Date    Arrhythmia     pvc's    Blurred vision     Cancer ([address]     BREAST    Coagulation disorder (HCC)     bleeds easily, and clots slowly.  Decubitus ulcer of left heel, unstageable ([address] 3/16/2016    Diabetes ([address]     GERD (gastroesophageal reflux disease)     Hypercholesterolemia     Hypothyroidism     IBS (irritable bowel syndrome)     Neuropathy       Past Surgical History:   Procedure Laterality Date    HX BLADDER SUSPENSION      HX CHOLECYSTECTOMY      HX HEMORRHOIDECTOMY  12/2012; 1/13    had two surgeries to repair.     HX ORTHOPAEDIC      hip     Allergies   Allergen Reactions    Adhesive Other (comments)    Amoxicillin Diarrhea     Hemorrhoids, no apetite    Atenolol Other (comments)     Noise in head and voice in body like a baby crying, seeing black spots    Bactrim [Sulfamethoprim Ds] Hives    Imdur [Isosorbide Mononitrate] Other (comments)     Headache and nightmares      Family History   Problem Relation Age of Onset    Heart Disease Sister     Heart Disease Brother     Cancer Father         lung    Cancer Daughter         breast      Social History     Socioeconomic History    Marital status:      Spouse name: Not on file    Number of children: Not on file    Years of education: Not on file    Highest education level: Not on file   Occupational History    Not on file   Social Needs    Financial resource strain: Not on file    Food insecurity     Worry: Not on file     Inability: Not on file    Transportation needs     Medical: Not on file     Non-medical: Not on file   Tobacco Use    Smoking status: Former Smoker     Packs/day: 0.25     Years: 2.00     Pack years: 0.50    Smokeless tobacco: Never Used   Substance and Sexual Activity    Alcohol use: No    Drug use: No    Sexual activity: Not Currently   Lifestyle    Physical activity     Days per week: Not on file     Minutes per session: Not on file    Stress: Not on file   Relationships    Social connections     Talks on phone: Not on file     Gets together: Not on file     Attends Confucianism service: Not on file     Active member of club or organization: Not on file     Attends meetings of clubs or organizations: Not on file     Relationship status: Not on file    Intimate partner violence     Fear of current or ex partner: Not on file     Emotionally abused: Not on file     Physically abused: Not on file     Forced sexual activity: Not on file   Other Topics Concern    Not on file   Social History Narrative    Not on file      Current Outpatient Medications   Medication Sig    hydrocortisone-pramoxine (ANALPRAM HC 2.5%-1%) 2.5-1 % rectal cream USE AS DIRECTED BID PRN    dilTIAZem (CARDIZEM) 60 mg tablet TAKE 1 TABLET BY MOUTH THREE TIMES DAILY    atorvastatin (LIPITOR) 40 mg tablet Take  by mouth daily.     multivitamin (ONE A DAY) tablet Take 1 Tab by mouth daily.  lactase (LACTAID PO) Take  by mouth as needed.  pantoprazole (PROTONIX) 40 mg tablet Take 40 mg by mouth daily.  LACTOBAC CMB #3/FOS/PANTETHINE (PROBIOTIC & ACIDOPHILUS PO) Take  by mouth daily.  gabapentin (NEURONTIN) 300 mg capsule TAKE ONE CAPSULE BY MOUTH FOUR TIMES DAILY (Patient taking differently: 300 mg breakfast lunch and dinner  600 mg QHS)    levothyroxine (SYNTHROID) 75 mcg tablet Take 1 Tab by mouth Daily (before breakfast).  PSYLLIUM HUSK (WAL-MUCIL FIBER PO) Take  by mouth as needed.  omega-3 fatty acids-vitamin e (FISH OIL) 1,000 mg cap Take 2 Caps by mouth daily.  CALCIUM CARBONATE/VITAMIN D3 (CALCIUM WITH VITAMIN D3 PO) Take 1,000 mg by mouth Three (3) times a week.  coenzyme q10-vitamin e (COQ10 ) 100-100 mg-unit Cap Take 100 mg by mouth two (2) times a day.  ranolazine ER (RANEXA) 500 mg SR tablet Take 1 Tab by mouth two (2) times a day. (Patient not taking: Reported on 5/27/2020)    nitroglycerin (NITROSTAT) 0.4 mg SL tablet 1 Tab by SubLINGual route every five (5) minutes as needed (call 911 if not relieved by 3). (Patient not taking: Reported on 5/27/2020)    aspirin delayed-release 81 mg tablet Take 81 mg by mouth every other day.  CINNAMON BARK (CINNAMON PO) Take 350 mg by mouth daily. No current facility-administered medications for this visit. Review of Symptoms:  11 systems reviewed, negative other than as stated in the HPI    Physical Exam:    Vitals:    05/27/20 1123   BP: 112/44   Pulse: 62   Weight: 137 lb (62.1 kg)   Height: 5' 5\" (1.651 m)     See patient reported vital signs in nursing note as applicable. Body mass index is 22.8 kg/m². General PE  Gen:  NAD  Mental Status - Alert. General Appearance - Not in acute distress. HEENT:  PER, EOM, no JVD  Chest and Lung Exam   Inspection: Accessory muscles - No use of accessory muscles in breathing. No audible wheezing. Normal effort in breathing. Symmetric excursion. Cardiovascular:  No JVD  Upper Extremity: Inspection - Bilateral - No Cyanotic nailbeds or Digital clubbing. Lower Extremity:   Palpation: Edema - Bilateral - No edema. Neuro: A&O times 3, CN and motor grossly WNL  Skin: no rashes or lesions on exposed skin, dry, intact  Psych: normal mood, affect. Speech clear. Labs:   Lab Results   Component Value Date/Time    Cholesterol, total 118 10/09/2015 02:27 PM    Cholesterol, total 146 06/09/2015 11:24 AM    Cholesterol, total 143 02/28/2014 11:30 AM    Cholesterol, total 162 10/23/2013 11:14 AM    Cholesterol, total 127 02/05/2013 11:22 AM    HDL Cholesterol 35 (L) 10/09/2015 02:27 PM    HDL Cholesterol 34 (L) 06/09/2015 11:24 AM    HDL Cholesterol 37 (L) 02/28/2014 11:30 AM    HDL Cholesterol 35 (L) 10/23/2013 11:14 AM    HDL Cholesterol 32 (L) 02/05/2013 11:22 AM    LDL, calculated 38 10/09/2015 02:27 PM    LDL, calculated 43 06/09/2015 11:24 AM    LDL, calculated 46 02/28/2014 11:30 AM    LDL, calculated Comment 10/23/2013 11:14 AM    LDL, calculated 46 02/05/2013 11:22 AM    Triglyceride 227 (H) 10/09/2015 02:27 PM    Triglyceride 347 (H) 06/09/2015 11:24 AM    Triglyceride 301 (H) 02/28/2014 11:30 AM    Triglyceride 419 (H) 10/23/2013 11:14 AM    Triglyceride 243 (H) 02/05/2013 11:22 AM    CHOL/HDL Ratio 4.8 06/09/2010 05:20 PM     Lab Results   Component Value Date/Time    CK 89 08/22/2015 04:25 PM     Lab Results   Component Value Date/Time    Sodium 142 10/09/2015 02:27 PM    Potassium 4.6 10/09/2015 02:27 PM    Chloride 105 10/09/2015 02:27 PM    CO2 19 10/09/2015 02:27 PM    Anion gap 9 08/25/2015 03:19 AM    Glucose 95 10/09/2015 02:27 PM    BUN 14 10/09/2015 02:27 PM    Creatinine 0.88 10/09/2015 02:27 PM    BUN/Creatinine ratio 16 10/09/2015 02:27 PM    GFR est AA 65 10/09/2015 02:27 PM    GFR est non-AA 56 (L) 10/09/2015 02:27 PM    Calcium 9.8 10/09/2015 02:27 PM    Bilirubin, total 0.3 10/09/2015 02:27 PM    Alk.  phosphatase 110 10/09/2015 02:27 PM    Protein, total 6.9 10/09/2015 02:27 PM    Albumin 3.8 10/09/2015 02:27 PM    Globulin 4.2 (H) 08/22/2015 04:25 PM    A-G Ratio 0.8 (L) 08/22/2015 04:25 PM    ALT (SGPT) 18 10/09/2015 02:27 PM          Assessment:      1. Hypertrophic cardiomyopathy ([address]    2. Mixed hyperlipidemia    3. Encounter for pre-operative cardiovascular clearance    4. HICKEY (dyspnea on exertion)    5. Other chest pain        Orders Placed This Encounter    hydrocortisone-pramoxine (ANALPRAM HC 2.5%-1%) 2.5-1 % rectal cream     Sig: USE AS DIRECTED BID PRN        Plan:     Patient presents for a telemedicine visit and seeking cardiac clearance for hemorrhoidectomy. Last seen in clinic 11/18. At that time we added Ranexa to her medical therapy. She never picked it up or followed up as planned. Hx Exertional chest pain, HICKEY  Nuclear stress test 8/18 showed large area of infarction in the infero-lateral wall. On ASA, statin, CCB  Did not tolerate Imdur previously. Continue Ranexa 500 mg BID  Repeat echo and Lexiscan preoperatively for possible hemorrhoidectomy. She will be at higher than average risk for any sort of surgery considering age, HOCM, and prior infarction. Reorder Ranexa as previously done in 2018. She never picked it up at that time. I emphasized that this may help with chest discomfort. HTN  Controlled with current therapy     HOCM  Echocardiogram in 8/18 shows normal pumping strength ejection fraction 65% of note nuclear stress test had falsely indicated at 35%.  She has known HOCM her peak gradient is 140 mmHg.    Tolerating increased dose of Diltiazem 60 mg Diltiazem three times a day  Repeat echo    HLD  On statin  Lipids and labs followed by PCP    Pre-op hemorrhoidectomy:  Echo and Lexiscan as noted above.   Need to strongly weigh the risks and benefits of surgery versus conservative management considering higher risk with HOCM, age 80, and prior infarction by last nuclear scan.    Continue current care and f/u in office after tests. Isabelle Espinal MD    This virtual visit was conducted with audiovisual connection using [employer] telemedicine services. Pursuant to the emergency declaration under the [address] [place] [address], [employer] waiver authority and the [employer] Act, this Virtual Visit was conducted, with patient's consent, to reduce the patient's risk of exposure to COVID-19 and provide continuity of care for an established patient. Services were provided through a video synchronous discussion virtually to substitute for in-person clinic visit.

## 2020-06-24 ENCOUNTER — TELEPHONE (OUTPATIENT)
Dept: CARDIOLOGY CLINIC | Age: 85
End: 2020-06-24

## 2020-06-24 NOTE — TELEPHONE ENCOUNTER
----- Message from Alvaro Montgomery MD sent at 6/23/2020  3:30 PM EDT -----  Please advise the patient that hemorrhoid surgery would be at much higher risk due to abnormal flow across aortic valve and advanced age, including a risk of death. Sera Hinojosa for now. If she wishes, schedule virtual visit to discuss.

## 2021-04-06 ENCOUNTER — TELEPHONE (OUTPATIENT)
Dept: CARDIOLOGY CLINIC | Age: 86
End: 2021-04-06

## 2021-04-06 NOTE — TELEPHONE ENCOUNTER
Please call patients daughter Usman Wailuku regarding patient having SOB.     [phone]    Thanks  Matthew Sol

## 2021-04-16 ENCOUNTER — TELEPHONE (OUTPATIENT)
Dept: CARDIOLOGY CLINIC | Age: 86
End: 2021-04-16

## 2021-04-16 NOTE — TELEPHONE ENCOUNTER
Spoke with patients daughter, Bill Mares , on [place]. She states patient gets sob upon walking. She does feel better at rest  Bill Mares is questioning if Dr Steve Gillespie could adjust her medications  Advised that he would not do that without seeing her  Patient schedule to be seen here on 5/12/21. Advised to call pcp to question inhaler of neb tx. Advised any sob not relieved with rest or chest pain develops - go to ER. Bill Mares verified understanding.

## 2021-05-12 ENCOUNTER — OFFICE VISIT (OUTPATIENT)
Dept: CARDIOLOGY CLINIC | Age: 86
End: 2021-05-12
Payer: MEDICARE

## 2021-05-12 VITALS
DIASTOLIC BLOOD PRESSURE: 62 MMHG | RESPIRATION RATE: 18 BRPM | BODY MASS INDEX: 23.16 KG/M2 | WEIGHT: 139 LBS | HEIGHT: 65 IN | OXYGEN SATURATION: 96 % | HEART RATE: 70 BPM | SYSTOLIC BLOOD PRESSURE: 132 MMHG

## 2021-05-12 DIAGNOSIS — E78.2 MIXED HYPERLIPIDEMIA: ICD-10-CM

## 2021-05-12 DIAGNOSIS — R06.09 DOE (DYSPNEA ON EXERTION): ICD-10-CM

## 2021-05-12 DIAGNOSIS — I42.2 HYPERTROPHIC CARDIOMYOPATHY (HCC): Primary | ICD-10-CM

## 2021-05-12 DIAGNOSIS — R07.89 OTHER CHEST PAIN: ICD-10-CM

## 2021-05-12 PROCEDURE — G8536 NO DOC ELDER MAL SCRN: HCPCS | Performed by: INTERNAL MEDICINE

## 2021-05-12 PROCEDURE — 93010 ELECTROCARDIOGRAM REPORT: CPT | Performed by: INTERNAL MEDICINE

## 2021-05-12 PROCEDURE — 1101F PT FALLS ASSESS-DOCD LE1/YR: CPT | Performed by: INTERNAL MEDICINE

## 2021-05-12 PROCEDURE — 93005 ELECTROCARDIOGRAM TRACING: CPT | Performed by: INTERNAL MEDICINE

## 2021-05-12 PROCEDURE — G0463 HOSPITAL OUTPT CLINIC VISIT: HCPCS | Performed by: INTERNAL MEDICINE

## 2021-05-12 PROCEDURE — G8427 DOCREV CUR MEDS BY ELIG CLIN: HCPCS | Performed by: INTERNAL MEDICINE

## 2021-05-12 PROCEDURE — 1090F PRES/ABSN URINE INCON ASSESS: CPT | Performed by: INTERNAL MEDICINE

## 2021-05-12 PROCEDURE — G8420 CALC BMI NORM PARAMETERS: HCPCS | Performed by: INTERNAL MEDICINE

## 2021-05-12 PROCEDURE — 99214 OFFICE O/P EST MOD 30 MIN: CPT | Performed by: INTERNAL MEDICINE

## 2021-05-12 PROCEDURE — G8510 SCR DEP NEG, NO PLAN REQD: HCPCS | Performed by: INTERNAL MEDICINE

## 2021-05-12 RX ORDER — NITROGLYCERIN 0.4 MG/1
0.4 TABLET SUBLINGUAL
Qty: 25 TAB | Refills: 1 | Status: CANCELLED | OUTPATIENT
Start: 2021-05-12

## 2021-05-12 NOTE — PROGRESS NOTES
Edd King is a 80 y.o. female  HIPAA verified by two patient identifiers. Health Maintenance Due   Topic    DTaP/Tdap/Td series (1 - Tdap)    Shingrix Vaccine Age 49> (1 of 2)    Bone Densitometry (Dexa) Screening     Pneumococcal 65+ years (1 of 1 - PPSV23)    Lipid Screen     Medicare Yearly Exam     COVID-19 Vaccine (2 - Moderna 2-dose series)     Chief Complaint   Patient presents with    Follow-up     1 year follow up cardiomyopathy    CHF     Visit Vitals  /62   Pulse 70   Resp 18   Ht 5' 5\" (1.651 m)   Wt 139 lb (63 kg)   SpO2 96%   BMI 23.13 kg/m²       Pain Scale: 0 - No pain/10  Pain Location:   1. Have you been to the ER, urgent care clinic since your last visit? Hospitalized since your last visit? No    2. Have you seen or consulted any other health care providers outside of the [address] [place] [address] since your last visit? Include any pap smears or colon screening.  No

## 2021-05-12 NOTE — PROGRESS NOTES
Alecia Zavaleta is a 80 y.o. female  HIPAA verified by two patient identifiers. Health Maintenance Due   Topic    DTaP/Tdap/Td series (1 - Tdap)    Shingrix Vaccine Age 49> (1 of 2)    Bone Densitometry (Dexa) Screening     Pneumococcal 65+ years (1 of 1 - PPSV23)    Lipid Screen     Medicare Yearly Exam     COVID-19 Vaccine (2 - Moderna 2-dose series)     Chief Complaint   Patient presents with    Follow-up     1 year follow up cardiomyopathy     Visit Vitals  Pulse 70   Resp 18   Ht 5' 5\" (1.651 m)   Wt 139 lb (63 kg)   SpO2 96%   BMI 23.13 kg/m²       Pain Scale: 0 - No pain/10  Pain Location:   1. Have you been to the ER, urgent care clinic since your last visit? Hospitalized since your last visit? No    2. Have you seen or consulted any other health care providers outside of the [address] [place] [address] since your last visit? Include any pap smears or colon screening.  No

## 2021-05-12 NOTE — LETTER
5/12/2021 Patient: Eliana López YOB: 1921 Date of Visit: 5/12/2021 Benny Randolph MD 
[facility] [address] [facility] [id number] Via Fax: [phone] Dear Benny Randolph MD, Thank you for referring Ms. Nelson Stephenson to [address] [place] [address] for evaluation. My notes for this consultation are attached. If you have questions, please do not hesitate to call me. I look forward to following your patient along with you.  
 
 
Sincerely, 
 
Mina Spain MD

## 2021-05-12 NOTE — PROGRESS NOTES
Sunday Rehman NP      Subjective/HPI:     Vlad Fernando is a 80 y.o. female is here for f/u appt. She reports she is still having mild chest discomfort with exertion. Still having mild HICKEY. She felt like she was going to pass out on Ranexa, called our on call MD and was advised to stop med. Denies worsening of symptoms. She has swelling at times in her legs. The patient denies orthopnea, PND, palpitations, or fatigue. PCP Provider  Grupo Calvin MD  Past Medical History:   Diagnosis Date    Arrhythmia     pvc's    Blurred vision     Cancer ([address]     BREAST    Coagulation disorder ([address]     bleeds easily, and clots slowly.  Decubitus ulcer of left heel, unstageable ([address] 3/16/2016    Diabetes ([address]     GERD (gastroesophageal reflux disease)     Hypercholesterolemia     Hypothyroidism     IBS (irritable bowel syndrome)     Neuropathy       Past Surgical History:   Procedure Laterality Date    HX BLADDER SUSPENSION      HX CHOLECYSTECTOMY      HX HEMORRHOIDECTOMY  12/2012; 1/13    had two surgeries to repair.  HX ORTHOPAEDIC      hip     Allergies   Allergen Reactions    Adhesive Other (comments)    Amoxicillin Diarrhea     Hemorrhoids, no apetite    Atenolol Other (comments)     Noise in head and voice in body like a baby crying, seeing black spots    Bactrim [Sulfamethoprim Ds] Hives    Imdur [Isosorbide Mononitrate] Other (comments)     Headache and nightmares    Ranexa [Ranolazine] Other (comments)     Felt lightheaded      Family History   Problem Relation Age of Onset    Heart Disease Sister     Heart Disease Brother     Cancer Father         lung    Cancer Daughter         breast      Current Outpatient Medications   Medication Sig    dilTIAZem (CARDIZEM) 60 mg tablet TAKE 1 TABLET BY MOUTH THREE TIMES DAILY    atorvastatin (LIPITOR) 40 mg tablet Take  by mouth daily.     multivitamin (ONE A DAY) tablet Take 1 Tab by mouth daily.    lactase (LACTAID PO) Take  by mouth as needed.  pantoprazole (PROTONIX) 40 mg tablet Take 40 mg by mouth daily.  LACTOBAC CMB #3/FOS/PANTETHINE (PROBIOTIC & ACIDOPHILUS PO) Take  by mouth daily.  gabapentin (NEURONTIN) 300 mg capsule TAKE ONE CAPSULE BY MOUTH FOUR TIMES DAILY (Patient taking differently: 300 mg breakfast lunch and dinner  600 mg QHS)    levothyroxine (SYNTHROID) 75 mcg tablet Take 1 Tab by mouth Daily (before breakfast).  PSYLLIUM HUSK (WAL-MUCIL FIBER PO) Take  by mouth as needed.  omega-3 fatty acids-vitamin e (FISH OIL) 1,000 mg cap Take 2 Caps by mouth daily.  CALCIUM CARBONATE/VITAMIN D3 (CALCIUM WITH VITAMIN D3 PO) Take 1,000 mg by mouth Three (3) times a week.  coenzyme q10-vitamin e (COQ10 ) 100-100 mg-unit Cap Take 100 mg by mouth two (2) times a day.  hydrocortisone-pramoxine (ANALPRAM HC 2.5%-1%) 2.5-1 % rectal cream USE AS DIRECTED BID PRN    aspirin delayed-release 81 mg tablet Take 81 mg by mouth every other day.  CINNAMON BARK (CINNAMON PO) Take 350 mg by mouth daily. No current facility-administered medications for this visit.        Vitals:    05/12/21 1300   BP: 132/62   Pulse: 70   Resp: 18   SpO2: 96%   Weight: 139 lb (63 kg)   Height: 5' 5\" (1.651 m)     Social History     Socioeconomic History    Marital status:      Spouse name: Not on file    Number of children: Not on file    Years of education: Not on file    Highest education level: Not on file   Occupational History    Not on file   Social Needs    Financial resource strain: Not on file    Food insecurity     Worry: Not on file     Inability: Not on file    Transportation needs     Medical: Not on file     Non-medical: Not on file   Tobacco Use    Smoking status: Former Smoker     Packs/day: 0.25     Years: 2.00     Pack years: 0.50    Smokeless tobacco: Never Used   Substance and Sexual Activity    Alcohol use: No    Drug use: No    Sexual activity: Not Currently   Lifestyle    Physical activity     Days per week: Not on file     Minutes per session: Not on file    Stress: Not on file   Relationships    Social connections     Talks on phone: Not on file     Gets together: Not on file     Attends Faith service: Not on file     Active member of club or organization: Not on file     Attends meetings of clubs or organizations: Not on file     Relationship status: Not on file    Intimate partner violence     Fear of current or ex partner: Not on file     Emotionally abused: Not on file     Physically abused: Not on file     Forced sexual activity: Not on file   Other Topics Concern    Not on file   Social History Narrative    Not on file       I have reviewed the nurses notes, vitals, problem list, allergy list, medical history, family, social history and medications. Review of Symptoms:  11 systems reviewed, negative other than as stated in the HPI    Physical Exam:      General: Well developed, in no acute distress, cooperative and alert  HEENT: No carotid bruits, no JVD, trach is midline. Neck Supple, PEERL, EOM intact. Heart:  Normal S1/S2 negative S3 or S4. Regular,+MAKEDA, no gallop or rub. Respiratory: Clear bilaterally x 4, no wheezing or rales  Abdomen:   Soft, non-tender, no masses, bowel sounds are active. Extremities:  No edema, normal cap refill, no cyanosis, atraumatic. Neuro: A&Ox3, speech clear, gait stable. Skin: Skin color is normal. No rashes or lesions.  Non diaphoretic  Vascular: 2+ pulses symmetric in all extremities    Cardiographics    ECG: SR, LVH  ST depression, T-abnormality    Results for orders placed or performed during the hospital encounter of 08/22/15   EKG, 12 LEAD, INITIAL   Result Value Ref Range    Ventricular Rate 83 BPM    Atrial Rate 83 BPM    P-R Interval 172 ms    QRS Duration 78 ms    Q-T Interval 370 ms    QTC Calculation (Bezet) 434 ms    Calculated P Axis 59 degrees    Calculated R Axis 43 degrees Calculated T Axis 155 degrees    Diagnosis       Normal sinus rhythm  Left ventricular hypertrophy with repolarization abnormality  When compared with ECG of 16-JUN-2015 10:19,  No significant change was found Except  for rate    Confirmed by Evelio Mccallum ([id number]) on 8/23/2015 3:23:36 PM           Cardiology Labs:  Lab Results   Component Value Date/Time    Cholesterol, total 118 10/09/2015 02:27 PM    HDL Cholesterol 35 (L) 10/09/2015 02:27 PM    LDL, calculated 38 10/09/2015 02:27 PM    LDL, calculated 43 06/09/2015 11:24 AM    LDL, calculated 46 02/28/2014 11:30 AM    VLDL, calculated 45 (H) 10/09/2015 02:27 PM    CHOL/HDL Ratio 4.8 06/09/2010 05:20 PM     Lab Results   Component Value Date/Time    Sodium 142 10/09/2015 02:27 PM    Potassium 4.6 10/09/2015 02:27 PM    Chloride 105 10/09/2015 02:27 PM    CO2 19 10/09/2015 02:27 PM    Glucose 95 10/09/2015 02:27 PM    BUN 14 10/09/2015 02:27 PM    Creatinine 0.88 10/09/2015 02:27 PM    BUN/Creatinine ratio 16 10/09/2015 02:27 PM    GFR est AA 65 10/09/2015 02:27 PM    GFR est non-AA 56 (L) 10/09/2015 02:27 PM    Calcium 9.8 10/09/2015 02:27 PM    Anion gap 9 08/25/2015 03:19 AM    Bilirubin, total 0.3 10/09/2015 02:27 PM    ALT (SGPT) 18 10/09/2015 02:27 PM    Alk. phosphatase 110 10/09/2015 02:27 PM    Protein, total 6.9 10/09/2015 02:27 PM    Albumin 3.8 10/09/2015 02:27 PM    Globulin 4.2 (H) 08/22/2015 04:25 PM    A-G Ratio 0.8 (L) 08/22/2015 04:25 PM     Lab Results   Component Value Date/Time    Hemoglobin A1c 6.2 (H) 10/09/2015 02:27 PM        Assessment:     Assessment:     Diagnoses and all orders for this visit:    1. Hypertrophic cardiomyopathy (HCC)  -     AMB POC EKG ROUTINE W/ 12 LEADS, INTER & REP    2. Mixed hyperlipidemia    3. HICKEY (dyspnea on exertion)    4. Other chest pain        ICD-10-CM ICD-9-CM    1. Hypertrophic cardiomyopathy (HCC)  I42.2 425.18 AMB POC EKG ROUTINE W/ 12 LEADS, INTER & REP   2. Mixed hyperlipidemia  E78.2 272.2    3.  HICKEY (dyspnea on exertion)  R06.00 786.09    4. Other chest pain  R07.89 786.59           Plan:     Hx Exertional chest pain, HICKEY  Nuclear stress test 8/18 showed large area of infarction in the infero-lateral wall. Patient was seen vs [facility] 5/22/20 requesting cardiac clearance for hemorrhoidectomy. She had an echo which showed EF 60-65% with severe concentric hypertrophy, mild to mod MR. Mod pHTN. Possible AS vs LVOT gradient. Stress test was cancelled, advised would be high risk for surgery. She was continued on ASA, Statin, CCB, and Ranexa 500mg bid. Did not tolerate Imdur previously. She felt lightheaded on Ranexa and this was d/c. She is still having mild CP and HICKEY. Discussed with patient that we can obtain stress test, if found positive would require cardiac cath, possibly blood thinners with known hemorrhoid issues. HTN  Controlled with current therapy     HOCM  Echocardiogram in 8/18 shows normal pumping strength ejection fraction 65% of note nuclear stress test had falsely indicated at 35%.  She has known HOCM her peak gradient is 140 mmHg in past. Echo 6/2020 showed EF 60-65% with severe concentric hypertrophy, AS vs LVOT gradient. On Diltiazem 60mg tid. HLD  On Lipitor 40mg. Lipids and labs followed by PCP      F/U in 6 months      Sarwat Grimaldo NP    Patient seen and examined by me with the above nurse practitioner. I personally performed all components of the history, physical, and medical decision making and agree with the assessment and plan with minor modifications as noted. Today the patient presents with mild exertional chest discomfort when she tries to rush to do things like housekeeping, folding clothes. General PE  Gen:  NAD  Mental Status - Alert. General Appearance - Not in acute distress. HEENT:  PERRL, no carotid bruits or JVD  Chest and Lung Exam   Inspection: Accessory muscles - No use of accessory muscles in breathing.    Auscultation:   Breath sounds: - Normal. Cardiovascular   Inspection: Jugular vein - Bilateral - Inspection Normal.   Palpation/Percussion:   Apical Impulse: - Normal.   Auscultation: Rhythm - Regular. Heart Sounds - S1 WNL and S2 WNL. No S3 or S4. Murmurs & Other Heart Sounds: Auscultation of the heart reveals - No Murmurs. Peripheral Vascular   Upper Extremity: Inspection - Bilateral - No Cyanotic nailbeds or Digital clubbing. Lower Extremity:   Palpation: Edema - Bilateral - No edema. Abdomen:   Soft, non-tender, bowel sounds are active. Neuro: A&O times 3, CN and motor grossly WNL    Mild exertional symptoms related to HOCM. No current complains of rectal bleeding. Advised at the age of 80, it is best to stay the current course with diltiazem for medical management, and to consider invasive procedures for HOCM at this age would be exceedingly dangerous and unnecessary. Instead, she will do her chores at half speed and let us know if anything worsens.

## 2022-03-18 PROBLEM — R07.9 CHEST PAIN: Status: ACTIVE | Noted: 2018-07-20

## 2022-03-18 PROBLEM — I42.2 HYPERTROPHIC CARDIOMYOPATHY (HCC): Status: ACTIVE | Noted: 2018-07-20

## 2022-03-19 PROBLEM — R06.09 DOE (DYSPNEA ON EXERTION): Status: ACTIVE | Noted: 2018-11-26

## 2023-04-11 ENCOUNTER — TELEPHONE (OUTPATIENT)
Dept: INTERNAL MEDICINE CLINIC | Age: 88
End: 2023-04-11

## 2023-04-17 ENCOUNTER — OFFICE VISIT (OUTPATIENT)
Dept: INTERNAL MEDICINE CLINIC | Age: 88
End: 2023-04-17
Payer: MEDICARE

## 2023-04-17 VITALS
OXYGEN SATURATION: 94 % | RESPIRATION RATE: 16 BRPM | BODY MASS INDEX: 22.23 KG/M2 | SYSTOLIC BLOOD PRESSURE: 112 MMHG | WEIGHT: 133.4 LBS | DIASTOLIC BLOOD PRESSURE: 69 MMHG | HEIGHT: 65 IN | TEMPERATURE: 97.7 F | HEART RATE: 82 BPM

## 2023-04-17 DIAGNOSIS — L89.620 DECUBITUS ULCER OF LEFT HEEL, UNSTAGEABLE (HCC): ICD-10-CM

## 2023-04-17 DIAGNOSIS — Z17.0 MALIGNANT NEOPLASM OF BREAST, STAGE 1, ESTROGEN RECEPTOR POSITIVE, UNSPECIFIED LATERALITY (HCC): ICD-10-CM

## 2023-04-17 DIAGNOSIS — E03.9 ACQUIRED HYPOTHYROIDISM: ICD-10-CM

## 2023-04-17 DIAGNOSIS — R73.01 IMPAIRED FASTING GLUCOSE: ICD-10-CM

## 2023-04-17 DIAGNOSIS — K58.9 IRRITABLE BOWEL SYNDROME, UNSPECIFIED TYPE: ICD-10-CM

## 2023-04-17 DIAGNOSIS — G62.9 NEUROPATHY: ICD-10-CM

## 2023-04-17 DIAGNOSIS — C50.919 MALIGNANT NEOPLASM OF BREAST, STAGE 1, ESTROGEN RECEPTOR POSITIVE, UNSPECIFIED LATERALITY (HCC): ICD-10-CM

## 2023-04-17 DIAGNOSIS — I10 ESSENTIAL HYPERTENSION: Primary | ICD-10-CM

## 2023-04-17 DIAGNOSIS — R06.09 DYSPNEA ON EXERTION: ICD-10-CM

## 2023-04-17 DIAGNOSIS — E78.00 HYPERCHOLESTEROLEMIA: ICD-10-CM

## 2023-04-17 DIAGNOSIS — I95.1 ORTHOSTATIC HYPOTENSION: ICD-10-CM

## 2023-04-17 DIAGNOSIS — I42.2 HYPERTROPHIC CARDIOMYOPATHY (HCC): ICD-10-CM

## 2023-04-17 DIAGNOSIS — R10.30 LOWER ABDOMINAL PAIN: ICD-10-CM

## 2023-04-17 PROCEDURE — 1101F PT FALLS ASSESS-DOCD LE1/YR: CPT | Performed by: INTERNAL MEDICINE

## 2023-04-17 PROCEDURE — G8427 DOCREV CUR MEDS BY ELIG CLIN: HCPCS | Performed by: INTERNAL MEDICINE

## 2023-04-17 PROCEDURE — 99204 OFFICE O/P NEW MOD 45 MIN: CPT | Performed by: INTERNAL MEDICINE

## 2023-04-17 PROCEDURE — G8536 NO DOC ELDER MAL SCRN: HCPCS | Performed by: INTERNAL MEDICINE

## 2023-04-17 PROCEDURE — G8420 CALC BMI NORM PARAMETERS: HCPCS | Performed by: INTERNAL MEDICINE

## 2023-04-17 PROCEDURE — 1090F PRES/ABSN URINE INCON ASSESS: CPT | Performed by: INTERNAL MEDICINE

## 2023-04-17 PROCEDURE — G8510 SCR DEP NEG, NO PLAN REQD: HCPCS | Performed by: INTERNAL MEDICINE

## 2023-04-17 PROCEDURE — G0463 HOSPITAL OUTPT CLINIC VISIT: HCPCS | Performed by: INTERNAL MEDICINE

## 2023-04-17 NOTE — PROGRESS NOTES
PROGRESS NOTE  Name: Dorathy Lombard   : 1921       ASSESSMENT/ PLAN:     Diagnoses and all orders for this visit:    Essential hypertension  -     LIPID PANEL; Future  -     METABOLIC PANEL, COMPREHENSIVE; Future  -     CBC WITH AUTOMATED DIFF; Future  -     LIPID PANEL  -     METABOLIC PANEL, COMPREHENSIVE  -     CBC WITH AUTOMATED DIFF    Hypercholesterolemia  -     LIPID PANEL; Future  -     METABOLIC PANEL, COMPREHENSIVE; Future  -     LIPID PANEL  -     METABOLIC PANEL, COMPREHENSIVE    Hypertrophic cardiomyopathy (HCC)    Irritable bowel syndrome, unspecified type    Neuropathy    Orthostatic hypotension    Impaired fasting glucose  -     HEMOGLOBIN A1C WITH EAG; Future  -     HEMOGLOBIN A1C WITH EAG    Acquired hypothyroidism  -     TSH 3RD GENERATION; Future  -     T4, FREE; Future  -     TSH 3RD GENERATION  -     T4, FREE    Decubitus ulcer of left heel, unstageable (HCC)    Malignant neoplasm of breast, stage 1, estrogen receptor positive, unspecified laterality (HCC)    Lower abdominal pain  -     REFERRAL TO GASTROENTEROLOGY  -     CT ABD PELV W CONT; Future    Dyspnea on exertion  -     XR CHEST PA LAT; Future      Follow-up and Dispositions    Return in about 6 months (around 10/17/2023) for HTN. I told her family that we will try to strike an optimal balance between avoidance of procedures that might do her harm, and declining to offer appropriate diagnosis and treatment services where desired. SUBJECTIVE:   Ms. Dorathy Lombard is a 80 y.o. female who is here for follow up of routine medical issues. Her daughters are with her. Chief Complaint   Patient presents with    Establish Care    Abdominal Pain    Lesion     Lower right leg       She lives with her family, who care for her. She is in a wheelchair today, ambulatory for only short distances with a walker. She has ongoing issues with balance, and difficulty walking and maneuvering stairs.   She has had a history of falls including a hip fracture. The patient is very hard of hearing, and this limits communication with her. Today her major complaint is of lower abdominal pain. She states that this has been present for a couple of years, though her daughter say has been more recent. She has a sensation of pressure and bloating in her lower abdomen. This occurs a few times a week. She is a bit tender on exam today. She has a longstanding history of irritable bowel syndrome with \"all sorts of problems with her bowels. \"  She has had occasional episodes of bowel incontinence with explosive diarrhea as well as feelings of incomplete evacuation. This is unchanged. She does not have constipation at this time. She has a history of bleeding hemorrhoids. Apparently she was evaluated by a colorectal specialist who is stated that it was not operable. She has had episodes of heavy bleeding at times. The family says, Nilda Stevenson are looking for somebody who will take care of her and not just refused to do anything for her because of her age. \"    She has a complaint of shortness of breath. This occurs with nearly any exertion such as going up steps, walking across the room, making a bed, etc.    For the time she was having a lot of issues with orthostatic dizziness. Her diltiazem was stopped and this improved. She has had issues with urinary incontinence which has been progressively worsening. She wears protective underwear. She has had cognitive decline which has been getting worse over the past 6 months. She is forgetful, repeats sentences, and is confused at times. She is unable to operate a telephone or TV remote which she previously was able to do. She has difficulty following conversations. She has a skin lesion of the right shin present since 2021. She states that they have tried 20 different creams including steroid creams and this has not helped.       She has vision impairment due to age-related macular degeneration. This is been slowly worsening over the past 20 years. She has been getting ocular injections. Her ophthalmologist is Dr. Beatris Jackson with [facility]. She has seen Dr. Hipolito Arboleda for hypertrophic cardiomyopathy. Also aortic stenosis and arrhythmia. Her last echocardiogram in 2020 showed EF of 60 to 65%. She has a severely dilated left atrium. She has a calcified trileaflet aortic valve with mild aortic valve regurg. She has mild to moderate mitral regurgitation. Moderate pulmonary hypertension was noted with a PA pressure of 63. Stress test back in 2018 showed a large area of infarction in the inferolateral wall. For a list of the medical issues addressed today, see the assessment and plan above. PMH:   Past Medical History:   Diagnosis Date    Arrhythmia     pvc's    Blurred vision     Cancer ([address]     BREAST    Coagulation disorder (HCC)     bleeds easily, and clots slowly. Decubitus ulcer of left heel, unstageable ([address] 3/16/2016    Diabetes ([address]     GERD (gastroesophageal reflux disease)     Hypercholesterolemia     Hypothyroidism     IBS (irritable bowel syndrome)     Neuropathy        Past Surgical History:   Procedure Laterality Date    HX BLADDER SUSPENSION      HX BREAST LUMPECTOMY N/A 8/7/2014    RIGHT BREAST EXCISIONAL BIOPSY WITH ULTRASOUND, LEFT BREAST LUMPECTOMY, LEFT BREAST NEEDLE LOCALIZATION WITH ULTRASOUND performed by Gabriel Mueller MD at hospitals MAIN OR    HX CHOLECYSTECTOMY      HX HEMORRHOIDECTOMY  12/2012; 1/13    had two surgeries to repair. HX ORTHOPAEDIC      hip       All: She is allergic to adhesive, amoxicillin, atenolol, bactrim [sulfamethoprim ds], imdur [isosorbide mononitrate], and ranexa [ranolazine]. Current Outpatient Medications   Medication Sig    multivitamin (ONE A DAY) tablet Take 1 Tablet by mouth daily. lactase (LACTAID PO) Take  by mouth as needed.     [address] CMB #3/FOS/PANTETHINE (PROBIOTIC & ACIDOPHILUS PO) Take  by mouth daily. gabapentin (NEURONTIN) 300 mg capsule TAKE ONE CAPSULE BY MOUTH FOUR TIMES DAILY (Patient taking differently: 300 mg breakfast lunch and dinner  600 mg QHS)    levothyroxine (SYNTHROID) 75 mcg tablet Take 1 Tab by mouth Daily (before breakfast). PSYLLIUM HUSK (WAL-MUCIL FIBER PO) Take  by mouth as needed. omega-3 fatty acids-vitamin e 1,000 mg cap Take 2 Capsules by mouth daily. CALCIUM CARBONATE/VITAMIN D3 (CALCIUM WITH VITAMIN D3 PO) Take 1,000 mg by mouth Three (3) times a week. coenzyme q10-vitamin e 100-100 mg-unit cap Take 100 mg by mouth two (2) times a day. hydrocortisone-pramoxine (ANALPRAM HC 2.5%-1%) 2.5-1 % rectal cream USE AS DIRECTED BID PRN    dilTIAZem (CARDIZEM) 60 mg tablet TAKE 1 TABLET BY MOUTH THREE TIMES DAILY    atorvastatin (LIPITOR) 40 mg tablet Take  by mouth daily. aspirin delayed-release 81 mg tablet Take 81 mg by mouth every other day. pantoprazole (PROTONIX) 40 mg tablet Take 40 mg by mouth daily. CINNAMON BARK (CINNAMON PO) Take 350 mg by mouth daily. No current facility-administered medications for this visit. FH: Her family history includes Cancer in her daughter and father; Heart Disease in her brother and sister. SH: She is , lives with her daughters. She reports that she has quit smoking. Her smoking use included cigarettes. She has a 0.50 pack-year smoking history. She has never used smokeless tobacco. She reports that she does not drink alcohol and does not use drugs. ROS: See above; Complete ROS otherwise negative. OBJECTIVE:   Vitals: Visit Vitals  /69 (BP 1 Location: Left upper arm, BP Patient Position: Sitting, BP Cuff Size: Adult)   Pulse 82   Temp 97.7 °F (36.5 °C) (Temporal)   Resp 16   Ht 5' 5\" (1.651 m)   Wt 133 lb 6.4 oz (60.5 kg)   SpO2 94%   BMI 22.20 kg/m²      Gen: Pleasant 80 y.o.  female in NAD. HEENT: PERRLA. EOMI. OP moist and pink.   Neck: Supple. No LAD. HEART: RRR, No M/G/R.    LUNGS: CTAB No W/R. ABDOMEN: S, NT, ND, BS+. EXTREMITIES: Warm. No C/C/E.  MUSCULOSKELETAL: Normal ROM, muscle strength 5/5 all groups. NEURO: Alert and oriented x 3. Cranial nerves grossly intact. No focal sensory or motor deficits noted. SKIN: Warm. Dry. The lesion of R LE is a brightly erythematous patch, annular, with slight scaling. Lab Results   Component Value Date/Time    Sodium 142 10/09/2015 02:27 PM    Potassium 4.6 10/09/2015 02:27 PM    Chloride 105 10/09/2015 02:27 PM    CO2 19 10/09/2015 02:27 PM    Anion gap 9 08/25/2015 03:19 AM    Glucose 95 10/09/2015 02:27 PM    BUN 14 10/09/2015 02:27 PM    Creatinine 0.88 10/09/2015 02:27 PM    BUN/Creatinine ratio 16 10/09/2015 02:27 PM    GFR est AA 65 10/09/2015 02:27 PM    GFR est non-AA 56 (L) 10/09/2015 02:27 PM    Calcium 9.8 10/09/2015 02:27 PM    Bilirubin, total 0.3 10/09/2015 02:27 PM    ALT (SGPT) 18 10/09/2015 02:27 PM    Alk.  phosphatase 110 10/09/2015 02:27 PM    Protein, total 6.9 10/09/2015 02:27 PM    Albumin 3.8 10/09/2015 02:27 PM    Globulin 4.2 (H) 08/22/2015 04:25 PM    A-G Ratio 0.8 (L) 08/22/2015 04:25 PM       Lab Results   Component Value Date/Time    Cholesterol, total 118 10/09/2015 02:27 PM    HDL Cholesterol 35 (L) 10/09/2015 02:27 PM    LDL, calculated 38 10/09/2015 02:27 PM    Triglyceride 227 (H) 10/09/2015 02:27 PM    CHOL/HDL Ratio 4.8 06/09/2010 05:20 PM        Lab Results   Component Value Date/Time    Hemoglobin A1c 6.2 (H) 10/09/2015 02:27 PM       Lab Results   Component Value Date/Time    WBC 8.2 10/09/2015 02:27 PM    HGB 10.8 (L) 10/09/2015 02:27 PM    HCT 34.4 10/09/2015 02:27 PM    PLATELET 819 45/64/5336 02:27 PM    MCV 84 10/09/2015 02:27 PM

## 2023-04-17 NOTE — PROGRESS NOTES
1. \"Have you been to the ER, urgent care clinic since your last visit? Hospitalized since your last visit? \" No    2. \"Have you seen or consulted any other health care providers outside of the [address] [place] [address] since your last visit? \" No     3. For patients aged 39-70: Has the patient had a colonoscopy / FIT/ Cologuard? Yes - no Care Gap present      If the patient is female:    4. For patients aged 41-77: Has the patient had a mammogram within the past 2 years? Yes - no Care Gap present      5. For patients aged 21-65: Has the patient had a pap smear?  Yes - no Care Gap present

## 2023-04-18 LAB
ALBUMIN SERPL-MCNC: 3 G/DL (ref 3.5–5)
ALBUMIN/GLOB SERPL: 0.8 (ref 1.1–2.2)
ALP SERPL-CCNC: 105 U/L (ref 45–117)
ALT SERPL-CCNC: 18 U/L (ref 12–78)
ANION GAP SERPL CALC-SCNC: 2 MMOL/L (ref 5–15)
AST SERPL-CCNC: 20 U/L (ref 15–37)
BASOPHILS # BLD: 0.1 K/UL (ref 0–0.1)
BASOPHILS NFR BLD: 1 % (ref 0–1)
BILIRUB SERPL-MCNC: 0.2 MG/DL (ref 0.2–1)
BUN SERPL-MCNC: 23 MG/DL (ref 6–20)
BUN/CREAT SERPL: 21 (ref 12–20)
CALCIUM SERPL-MCNC: 9.1 MG/DL (ref 8.5–10.1)
CHLORIDE SERPL-SCNC: 111 MMOL/L (ref 97–108)
CHOLEST SERPL-MCNC: 192 MG/DL
CO2 SERPL-SCNC: 25 MMOL/L (ref 21–32)
CREAT SERPL-MCNC: 1.11 MG/DL (ref 0.55–1.02)
DIFFERENTIAL METHOD BLD: ABNORMAL
EOSINOPHIL # BLD: 0.2 K/UL (ref 0–0.4)
EOSINOPHIL NFR BLD: 2 % (ref 0–7)
ERYTHROCYTE [DISTWIDTH] IN BLOOD BY AUTOMATED COUNT: 14.7 % (ref 11.5–14.5)
EST. AVERAGE GLUCOSE BLD GHB EST-MCNC: 126 MG/DL
GLOBULIN SER CALC-MCNC: 4 G/DL (ref 2–4)
GLUCOSE SERPL-MCNC: 111 MG/DL (ref 65–100)
HBA1C MFR BLD: 6 % (ref 4–5.6)
HCT VFR BLD AUTO: 38.8 % (ref 35–47)
HDLC SERPL-MCNC: 30 MG/DL
HDLC SERPL: 6.4 (ref 0–5)
HGB BLD-MCNC: 11.7 G/DL (ref 11.5–16)
IMM GRANULOCYTES # BLD AUTO: 0 K/UL (ref 0–0.04)
IMM GRANULOCYTES NFR BLD AUTO: 0 % (ref 0–0.5)
LDLC SERPL CALC-MCNC: 88.4 MG/DL (ref 0–100)
LYMPHOCYTES # BLD: 1.8 K/UL (ref 0.8–3.5)
LYMPHOCYTES NFR BLD: 20 % (ref 12–49)
MCH RBC QN AUTO: 27.4 PG (ref 26–34)
MCHC RBC AUTO-ENTMCNC: 30.2 G/DL (ref 30–36.5)
MCV RBC AUTO: 90.9 FL (ref 80–99)
MONOCYTES # BLD: 0.5 K/UL (ref 0–1)
MONOCYTES NFR BLD: 6 % (ref 5–13)
NEUTS SEG # BLD: 6.2 K/UL (ref 1.8–8)
NEUTS SEG NFR BLD: 71 % (ref 32–75)
NRBC # BLD: 0 K/UL (ref 0–0.01)
NRBC BLD-RTO: 0 PER 100 WBC
PLATELET # BLD AUTO: 250 K/UL (ref 150–400)
POTASSIUM SERPL-SCNC: 5.5 MMOL/L (ref 3.5–5.1)
PROT SERPL-MCNC: 7 G/DL (ref 6.4–8.2)
RBC # BLD AUTO: 4.27 M/UL (ref 3.8–5.2)
SODIUM SERPL-SCNC: 138 MMOL/L (ref 136–145)
T4 FREE SERPL-MCNC: 1.2 NG/DL (ref 0.8–1.5)
TRIGL SERPL-MCNC: 368 MG/DL (ref ?–150)
TSH SERPL DL<=0.05 MIU/L-ACNC: 1.03 UIU/ML (ref 0.36–3.74)
VLDLC SERPL CALC-MCNC: 73.6 MG/DL
WBC # BLD AUTO: 8.8 K/UL (ref 3.6–11)

## 2023-04-23 ENCOUNTER — TRANSCRIBE ORDERS (OUTPATIENT)
Facility: HOSPITAL | Age: 88
End: 2023-04-23

## 2023-04-23 DIAGNOSIS — R10.30 LOWER ABDOMINAL PAIN: Primary | ICD-10-CM

## 2023-05-08 ENCOUNTER — HOSPITAL ENCOUNTER (OUTPATIENT)
Facility: HOSPITAL | Age: 88
Discharge: HOME | End: 2023-05-11
Payer: MEDICARE

## 2023-05-08 DIAGNOSIS — R10.30 LOWER ABDOMINAL PAIN: ICD-10-CM

## 2023-05-08 LAB — CREAT BLD-MCNC: 1.3 MG/DL (ref 0.6–1.3)

## 2023-05-08 PROCEDURE — 82565 ASSAY OF CREATININE: CPT

## 2023-05-08 PROCEDURE — 6360000004 HC RX CONTRAST MEDICATION: Performed by: INTERNAL MEDICINE

## 2023-05-08 PROCEDURE — 74177 CT ABD & PELVIS W/CONTRAST: CPT

## 2023-05-08 RX ADMIN — IOPAMIDOL 100 ML: 755 INJECTION, SOLUTION INTRAVENOUS at 15:00

## 2023-05-11 ENCOUNTER — TELEPHONE (OUTPATIENT)
Age: 88
End: 2023-05-11

## 2023-05-16 NOTE — TELEPHONE ENCOUNTER
Spoke with daughter Rhonda Frias, HIPAA  Two pt identifiers confirmed. Notified of PCP response to CT of: these results are normal       Daughter's are inquiring about CT results, especially the mentions of:     Small hernia to stomach  Extensive Colonic Diverticulosis  Right adnexal cyst      They want to know if these are concerning and if any changes in plan of care    Daughter's are also asking if the Creatinine POC done on date of CT needs to be repeated or if any changes to plan of care are needed.

## 2023-05-18 NOTE — TELEPHONE ENCOUNTER
These are what are known as \"incidental findings\", and are not the cause of her symptoms. The cyst is likely benign. The diverticulosis is a very common finding and may or may not cause future problems--high fiber diet recommended. Small stomach hernia may cause acid reflux--if this gets really bad let us know.    BJF

## 2023-05-28 ENCOUNTER — HOSPITAL ENCOUNTER (EMERGENCY)
Facility: HOSPITAL | Age: 88
Discharge: HOME | End: 2023-05-28
Attending: EMERGENCY MEDICINE
Payer: MEDICARE

## 2023-05-28 ENCOUNTER — APPOINTMENT (OUTPATIENT)
Facility: HOSPITAL | Age: 88
End: 2023-05-28
Payer: MEDICARE

## 2023-05-28 VITALS
RESPIRATION RATE: 18 BRPM | SYSTOLIC BLOOD PRESSURE: 113 MMHG | TEMPERATURE: 98.1 F | OXYGEN SATURATION: 96 % | WEIGHT: 135 LBS | DIASTOLIC BLOOD PRESSURE: 70 MMHG | HEART RATE: 85 BPM | BODY MASS INDEX: 22.49 KG/M2 | HEIGHT: 65 IN

## 2023-05-28 DIAGNOSIS — R06.02 SHORTNESS OF BREATH: ICD-10-CM

## 2023-05-28 DIAGNOSIS — J20.9 ACUTE BRONCHITIS WITH WHEEZING: Primary | ICD-10-CM

## 2023-05-28 LAB
ALBUMIN SERPL-MCNC: 3.2 G/DL (ref 3.5–5)
ALBUMIN/GLOB SERPL: 0.7 (ref 1.1–2.2)
ALP SERPL-CCNC: 98 U/L (ref 45–117)
ALT SERPL-CCNC: 18 U/L (ref 12–78)
ANION GAP SERPL CALC-SCNC: 5 MMOL/L (ref 5–15)
AST SERPL-CCNC: 18 U/L (ref 15–37)
BASOPHILS # BLD: 0.1 K/UL (ref 0–0.1)
BASOPHILS NFR BLD: 1 % (ref 0–1)
BILIRUB SERPL-MCNC: 0.5 MG/DL (ref 0.2–1)
BUN SERPL-MCNC: 30 MG/DL (ref 6–20)
BUN/CREAT SERPL: 21 (ref 12–20)
CALCIUM SERPL-MCNC: 9.2 MG/DL (ref 8.5–10.1)
CHLORIDE SERPL-SCNC: 108 MMOL/L (ref 97–108)
CO2 SERPL-SCNC: 23 MMOL/L (ref 21–32)
CREAT SERPL-MCNC: 1.46 MG/DL (ref 0.55–1.02)
DIFFERENTIAL METHOD BLD: ABNORMAL
EOSINOPHIL # BLD: 0.3 K/UL (ref 0–0.4)
EOSINOPHIL NFR BLD: 3 % (ref 0–7)
ERYTHROCYTE [DISTWIDTH] IN BLOOD BY AUTOMATED COUNT: 15 % (ref 11.5–14.5)
FLUAV AG NPH QL IA: NEGATIVE
FLUBV AG NOSE QL IA: NEGATIVE
GLOBULIN SER CALC-MCNC: 4.7 G/DL (ref 2–4)
GLUCOSE SERPL-MCNC: 167 MG/DL (ref 65–100)
HCT VFR BLD AUTO: 37.1 % (ref 35–47)
HGB BLD-MCNC: 12.1 G/DL (ref 11.5–16)
IMM GRANULOCYTES # BLD AUTO: 0.1 K/UL (ref 0–0.04)
IMM GRANULOCYTES NFR BLD AUTO: 1 % (ref 0–0.5)
LYMPHOCYTES # BLD: 1.9 K/UL (ref 0.8–3.5)
LYMPHOCYTES NFR BLD: 18 % (ref 12–49)
MAGNESIUM SERPL-MCNC: 2 MG/DL (ref 1.6–2.4)
MCH RBC QN AUTO: 28.1 PG (ref 26–34)
MCHC RBC AUTO-ENTMCNC: 32.6 G/DL (ref 30–36.5)
MCV RBC AUTO: 86.3 FL (ref 80–99)
MONOCYTES # BLD: 0.5 K/UL (ref 0–1)
MONOCYTES NFR BLD: 5 % (ref 5–13)
NEUTS SEG # BLD: 7.5 K/UL (ref 1.8–8)
NEUTS SEG NFR BLD: 72 % (ref 32–75)
NRBC # BLD: 0 K/UL (ref 0–0.01)
NRBC BLD-RTO: 0 PER 100 WBC
NT PRO BNP: 4979 PG/ML
PLATELET # BLD AUTO: 253 K/UL (ref 150–400)
PMV BLD AUTO: 12.1 FL (ref 8.9–12.9)
POTASSIUM SERPL-SCNC: 5 MMOL/L (ref 3.5–5.1)
PROT SERPL-MCNC: 7.9 G/DL (ref 6.4–8.2)
RBC # BLD AUTO: 4.3 M/UL (ref 3.8–5.2)
SARS-COV-2 RDRP RESP QL NAA+PROBE: NOT DETECTED
SODIUM SERPL-SCNC: 136 MMOL/L (ref 136–145)
SOURCE: NORMAL
TROPONIN I SERPL HS-MCNC: 55 NG/L (ref 0–51)
TROPONIN I SERPL HS-MCNC: 55 NG/L (ref 0–51)
WBC # BLD AUTO: 10.3 K/UL (ref 3.6–11)

## 2023-05-28 PROCEDURE — 6360000002 HC RX W HCPCS: Performed by: EMERGENCY MEDICINE

## 2023-05-28 PROCEDURE — 84484 ASSAY OF TROPONIN QUANT: CPT

## 2023-05-28 PROCEDURE — 83880 ASSAY OF NATRIURETIC PEPTIDE: CPT

## 2023-05-28 PROCEDURE — 93005 ELECTROCARDIOGRAM TRACING: CPT | Performed by: EMERGENCY MEDICINE

## 2023-05-28 PROCEDURE — 80053 COMPREHEN METABOLIC PANEL: CPT

## 2023-05-28 PROCEDURE — 87804 INFLUENZA ASSAY W/OPTIC: CPT

## 2023-05-28 PROCEDURE — 94640 AIRWAY INHALATION TREATMENT: CPT

## 2023-05-28 PROCEDURE — 36415 COLL VENOUS BLD VENIPUNCTURE: CPT

## 2023-05-28 PROCEDURE — 96374 THER/PROPH/DIAG INJ IV PUSH: CPT

## 2023-05-28 PROCEDURE — 99285 EMERGENCY DEPT VISIT HI MDM: CPT

## 2023-05-28 PROCEDURE — 87635 SARS-COV-2 COVID-19 AMP PRB: CPT

## 2023-05-28 PROCEDURE — 83735 ASSAY OF MAGNESIUM: CPT

## 2023-05-28 PROCEDURE — 71045 X-RAY EXAM CHEST 1 VIEW: CPT

## 2023-05-28 PROCEDURE — 85025 COMPLETE CBC W/AUTO DIFF WBC: CPT

## 2023-05-28 RX ORDER — ALBUTEROL SULFATE 2.5 MG/3ML
5 SOLUTION RESPIRATORY (INHALATION)
Status: COMPLETED | OUTPATIENT
Start: 2023-05-28 | End: 2023-05-28

## 2023-05-28 RX ORDER — PREDNISONE 20 MG/1
20 TABLET ORAL 2 TIMES DAILY
Qty: 10 TABLET | Refills: 0 | Status: SHIPPED | OUTPATIENT
Start: 2023-05-28 | End: 2023-06-02

## 2023-05-28 RX ORDER — ALBUTEROL SULFATE 1.25 MG/3ML
1 SOLUTION RESPIRATORY (INHALATION) EVERY 6 HOURS PRN
Qty: 360 ML | Refills: 3 | Status: SHIPPED | OUTPATIENT
Start: 2023-05-28

## 2023-05-28 RX ORDER — METHYLPREDNISOLONE SODIUM SUCCINATE 125 MG/2ML
125 INJECTION, POWDER, LYOPHILIZED, FOR SOLUTION INTRAMUSCULAR; INTRAVENOUS ONCE
Status: COMPLETED | OUTPATIENT
Start: 2023-05-28 | End: 2023-05-28

## 2023-05-28 RX ORDER — ALBUTEROL SULFATE 90 UG/1
2 AEROSOL, METERED RESPIRATORY (INHALATION) 4 TIMES DAILY PRN
Qty: 54 G | Refills: 1 | Status: SHIPPED | OUTPATIENT
Start: 2023-05-28

## 2023-05-28 RX ORDER — AZITHROMYCIN 250 MG/1
250 TABLET, FILM COATED ORAL SEE ADMIN INSTRUCTIONS
Qty: 6 TABLET | Refills: 0 | Status: SHIPPED | OUTPATIENT
Start: 2023-05-28 | End: 2023-06-02

## 2023-05-28 RX ADMIN — METHYLPREDNISOLONE SODIUM SUCCINATE 125 MG: 125 INJECTION, POWDER, FOR SOLUTION INTRAMUSCULAR; INTRAVENOUS at 16:19

## 2023-05-28 RX ADMIN — IPRATROPIUM BROMIDE 0.5 MG: 0.5 SOLUTION RESPIRATORY (INHALATION) at 16:19

## 2023-05-28 RX ADMIN — ALBUTEROL SULFATE 5 MG: 2.5 SOLUTION RESPIRATORY (INHALATION) at 16:19

## 2023-05-28 RX ADMIN — ALBUTEROL SULFATE 5 MG: 2.5 SOLUTION RESPIRATORY (INHALATION) at 17:52

## 2023-05-28 ASSESSMENT — LIFESTYLE VARIABLES
HOW OFTEN DO YOU HAVE A DRINK CONTAINING ALCOHOL: NEVER
HOW MANY STANDARD DRINKS CONTAINING ALCOHOL DO YOU HAVE ON A TYPICAL DAY: PATIENT DOES NOT DRINK

## 2023-05-28 ASSESSMENT — PAIN SCALES - GENERAL: PAINLEVEL_OUTOF10: 0

## 2023-05-28 ASSESSMENT — PAIN - FUNCTIONAL ASSESSMENT: PAIN_FUNCTIONAL_ASSESSMENT: 0-10

## 2023-05-30 LAB
EKG ATRIAL RATE: 103 BPM
EKG DIAGNOSIS: NORMAL
EKG P AXIS: 75 DEGREES
EKG P-R INTERVAL: 190 MS
EKG Q-T INTERVAL: 342 MS
EKG QRS DURATION: 88 MS
EKG QTC CALCULATION (BAZETT): 448 MS
EKG R AXIS: 4 DEGREES
EKG T AXIS: 185 DEGREES
EKG VENTRICULAR RATE: 103 BPM

## 2023-05-31 ENCOUNTER — TELEPHONE (OUTPATIENT)
Age: 88
End: 2023-05-31

## 2023-05-31 DIAGNOSIS — R06.09 OTHER FORMS OF DYSPNEA: Primary | ICD-10-CM

## 2023-05-31 RX ORDER — INHALER, ASSIST DEVICES
1 SPACER (EA) MISCELLANEOUS 2 TIMES DAILY PRN
Qty: 1 EACH | Refills: 0 | Status: SHIPPED | OUTPATIENT
Start: 2023-05-31

## 2023-05-31 NOTE — TELEPHONE ENCOUNTER
F/U Regarding ED visit on:   5/28/23      Caller states:  Concern:   Acute bronchitis with wheezing  Shortness of breath    Request:   ED Follow up visit  No appts in near future to meet needs, states needs right away. Can pt be worked in with pcp or another provider? CLINICAL QUESTION:   is 8 years old and doesn't understand the concept of inhaling for the inhaler. Was prescribed ventolin albuterol sulfate soln and inhaler. Pt hasn't been able to get nebulizer but but regarding inhaler,  states pt cannot exhale without coughing and states pt isn't really inhaling med, like she doesn't have control of the function to do that. Is there another option to inhaler or nebulizer? Call to advise please      Caller confirms readback of documented phone/fax number(s) as correct.

## 2023-06-20 ENCOUNTER — TELEPHONE (OUTPATIENT)
Age: 88
End: 2023-06-20

## 2023-06-20 NOTE — TELEPHONE ENCOUNTER
Two pt identifiers confirmed. The patient's daughter stated her mom has had a significant mental change since last night. She is slurring her words and has weakness. She was in the hospital about 3 weeks ago for a URI and is still not \"herself\" or well. I advised her to take her mom to the ED for evaluation. Pt's daughter verbalized understanding of information discussed w/ no further questions at this time.       Harvinder Mcdonough [address] [facility]  [address]  W: [phone]  F: [phone]

## 2023-06-20 NOTE — TELEPHONE ENCOUNTER
States calling to follow up    States pt is not better from cough and states not sure if its the illness or if pt had a mini-stroke:    States Friday morning pt couldn't speak,   Some slurred speech,  no strength in voice, and   mannerisms were \"off\"      Caller wanted to schedule appt but due to symptoms mentioned, psr is referring request to  for clinical recommendation    Please call to advise on either number

## 2023-12-05 ENCOUNTER — OFFICE VISIT (OUTPATIENT)
Age: 88
End: 2023-12-05
Payer: MEDICARE

## 2023-12-05 VITALS
WEIGHT: 137 LBS | BODY MASS INDEX: 22.82 KG/M2 | HEART RATE: 100 BPM | DIASTOLIC BLOOD PRESSURE: 65 MMHG | HEIGHT: 65 IN | TEMPERATURE: 97 F | SYSTOLIC BLOOD PRESSURE: 100 MMHG | RESPIRATION RATE: 18 BRPM | OXYGEN SATURATION: 93 %

## 2023-12-05 DIAGNOSIS — R73.01 IMPAIRED FASTING GLUCOSE: ICD-10-CM

## 2023-12-05 DIAGNOSIS — E03.9 HYPOTHYROIDISM, UNSPECIFIED TYPE: ICD-10-CM

## 2023-12-05 DIAGNOSIS — I10 ESSENTIAL (PRIMARY) HYPERTENSION: ICD-10-CM

## 2023-12-05 DIAGNOSIS — F51.01 PRIMARY INSOMNIA: ICD-10-CM

## 2023-12-05 DIAGNOSIS — E78.00 PURE HYPERCHOLESTEROLEMIA, UNSPECIFIED: ICD-10-CM

## 2023-12-05 DIAGNOSIS — Z23 FLU VACCINE NEED: ICD-10-CM

## 2023-12-05 DIAGNOSIS — G62.9 POLYNEUROPATHY, UNSPECIFIED: ICD-10-CM

## 2023-12-05 DIAGNOSIS — Z00.00 MEDICARE ANNUAL WELLNESS VISIT, SUBSEQUENT: Primary | ICD-10-CM

## 2023-12-05 PROCEDURE — 90694 VACC AIIV4 NO PRSRV 0.5ML IM: CPT | Performed by: INTERNAL MEDICINE

## 2023-12-05 PROCEDURE — 99214 OFFICE O/P EST MOD 30 MIN: CPT | Performed by: INTERNAL MEDICINE

## 2023-12-05 PROCEDURE — 1123F ACP DISCUSS/DSCN MKR DOCD: CPT | Performed by: INTERNAL MEDICINE

## 2023-12-05 PROCEDURE — G0439 PPPS, SUBSEQ VISIT: HCPCS | Performed by: INTERNAL MEDICINE

## 2023-12-05 PROCEDURE — PBSHW INFLUENZA, FLUAD, (AGE 65 Y+), IM, PF, 0.5 ML: Performed by: INTERNAL MEDICINE

## 2023-12-05 RX ORDER — LEVOTHYROXINE SODIUM 0.07 MG/1
75 TABLET ORAL
Qty: 90 TABLET | Refills: 3 | Status: SHIPPED | OUTPATIENT
Start: 2023-12-05

## 2023-12-05 RX ORDER — GABAPENTIN 300 MG/1
300 CAPSULE ORAL 4 TIMES DAILY
Qty: 360 CAPSULE | Refills: 1 | Status: SHIPPED | OUTPATIENT
Start: 2023-12-05 | End: 2024-06-02

## 2023-12-05 RX ORDER — TRAZODONE HYDROCHLORIDE 50 MG/1
50 TABLET ORAL NIGHTLY
Qty: 90 TABLET | Refills: 1 | Status: SHIPPED | OUTPATIENT
Start: 2023-12-05

## 2023-12-05 ASSESSMENT — PATIENT HEALTH QUESTIONNAIRE - PHQ9
1. LITTLE INTEREST OR PLEASURE IN DOING THINGS: 0
SUM OF ALL RESPONSES TO PHQ QUESTIONS 1-9: 0
SUM OF ALL RESPONSES TO PHQ QUESTIONS 1-9: 0
2. FEELING DOWN, DEPRESSED OR HOPELESS: 0
SUM OF ALL RESPONSES TO PHQ QUESTIONS 1-9: 0
SUM OF ALL RESPONSES TO PHQ QUESTIONS 1-9: 0
SUM OF ALL RESPONSES TO PHQ9 QUESTIONS 1 & 2: 0

## 2023-12-05 NOTE — PROGRESS NOTES
PROGRESS NOTE  Name: Anca Cash   : 1921       ASSESSMENT/ PLAN:     Chang Mata was seen today for medicare awv and sleep problem. Polyneuropathy, unspecified  -     gabapentin (NEURONTIN) 300 MG capsule; Take 1 capsule by mouth 4 times daily for 180 days. Max Daily Amount: 1,200 mg    Essential (primary) hypertension: Off all meds. BP fine today. Pure hypercholesterolemia, unspecified: No longer on statin. Doesn't desire to recheck labs. Impaired fasting glucose    Hypothyroidism, unspecified type: Continue levothyroxine. I'll order labs. Insomnia: Rx for trazodone. IBS Stable. Dyspnea on exertion: Ongoing. CXR was negative. Hypertrophic cardiomyopathy: Sees Dr. Michael Ac. Follow-up and Dispositions    Return in about 6 months (around 2024) for thyroid, etc.       I have reviewed the patient's medications and risks/side effects/benefits were discussed. Diagnosis(-es) explained to patient and questions answered. Literature provided where appropriate. SUBJECTIVE:   Ms. Anca Cash is a 80 y.o. female who presents today for follow up. Chief Complaint   Patient presents with    Medicare AWV    Sleep Problem       She lives with her family, who care for her. She is in a wheelchair today, ambulatory for only short distances with a walker. She has ongoing issues with balance, and difficulty walking and maneuvering stairs. She has had a history of falls including a hip fracture. The patient is very hard of hearing, and this limits communication with her. She has insomnia, with difficulty both with sleep onset and sleep maintenance. Her abdominal pain discussed last visit has resolved, per pateint. She has a longstanding history of irritable bowel syndrome with \"all sorts of problems with her bowels. \"  She has had occasional episodes of bowel incontinence with explosive diarrhea as well as feelings of incomplete evacuation.   This is

## 2023-12-05 NOTE — PROGRESS NOTES
1. \"Have you been to the ER, urgent care clinic since your last visit? Hospitalized since your last visit? \" No    2. \"Have you seen or consulted any other health care providers outside of the [address] [place] [address] since your last visit? \" No     3. For patients aged 43-73: Has the patient had a colonoscopy / FIT/ Cologuard? NA - based on age      If the patient is female:    4. For patients aged 43-66: Has the patient had a mammogram within the past 2 years? NA - based on age or sex      11. For patients aged 21-65: Has the patient had a pap smear?  NA - based on age or sex

## 2023-12-06 LAB
ALBUMIN SERPL-MCNC: 3.2 G/DL (ref 3.5–5)
ALBUMIN/GLOB SERPL: 0.9 (ref 1.1–2.2)
ALP SERPL-CCNC: 85 U/L (ref 45–117)
ALT SERPL-CCNC: 15 U/L (ref 12–78)
ANION GAP SERPL CALC-SCNC: 7 MMOL/L (ref 5–15)
AST SERPL-CCNC: 14 U/L (ref 15–37)
BILIRUB SERPL-MCNC: 0.3 MG/DL (ref 0.2–1)
BUN SERPL-MCNC: 20 MG/DL (ref 6–20)
BUN/CREAT SERPL: 19 (ref 12–20)
CALCIUM SERPL-MCNC: 9.3 MG/DL (ref 8.5–10.1)
CHLORIDE SERPL-SCNC: 111 MMOL/L (ref 97–108)
CO2 SERPL-SCNC: 25 MMOL/L (ref 21–32)
CREAT SERPL-MCNC: 1.04 MG/DL (ref 0.55–1.02)
GLOBULIN SER CALC-MCNC: 3.6 G/DL (ref 2–4)
GLUCOSE SERPL-MCNC: 145 MG/DL (ref 65–100)
POTASSIUM SERPL-SCNC: 4.5 MMOL/L (ref 3.5–5.1)
PROT SERPL-MCNC: 6.8 G/DL (ref 6.4–8.2)
SODIUM SERPL-SCNC: 143 MMOL/L (ref 136–145)
T4 FREE SERPL-MCNC: 1.2 NG/DL (ref 0.8–1.5)
TSH SERPL DL<=0.05 MIU/L-ACNC: 1.11 UIU/ML (ref 0.36–3.74)

## 2023-12-29 ENCOUNTER — HOSPITAL ENCOUNTER (INPATIENT)
Facility: HOSPITAL | Age: 88
LOS: 5 days | DRG: 177 | End: 2024-01-04
Attending: EMERGENCY MEDICINE | Admitting: INTERNAL MEDICINE
Payer: MEDICARE

## 2023-12-29 ENCOUNTER — APPOINTMENT (OUTPATIENT)
Facility: HOSPITAL | Age: 88
DRG: 177 | End: 2023-12-29
Payer: MEDICARE

## 2023-12-29 DIAGNOSIS — I21.4 NSTEMI (NON-ST ELEVATED MYOCARDIAL INFARCTION) (HCC): ICD-10-CM

## 2023-12-29 DIAGNOSIS — I21.4 NON-ST ELEVATION MI (NSTEMI) (HCC): ICD-10-CM

## 2023-12-29 DIAGNOSIS — U07.1 COVID-19: Primary | ICD-10-CM

## 2023-12-29 LAB
APPEARANCE UR: CLEAR
BACTERIA URNS QL MICRO: NEGATIVE /HPF
BASOPHILS # BLD: 0 K/UL (ref 0–0.1)
BASOPHILS NFR BLD: 0 % (ref 0–1)
BILIRUB UR QL: NEGATIVE
COLOR UR: ABNORMAL
COMMENT:: NORMAL
DIFFERENTIAL METHOD BLD: ABNORMAL
EOSINOPHIL # BLD: 0 K/UL (ref 0–0.4)
EOSINOPHIL NFR BLD: 0 % (ref 0–7)
EPITH CASTS URNS QL MICRO: ABNORMAL /LPF
ERYTHROCYTE [DISTWIDTH] IN BLOOD BY AUTOMATED COUNT: 14.5 % (ref 11.5–14.5)
FLUAV AG NPH QL IA: NEGATIVE
FLUBV AG NOSE QL IA: NEGATIVE
GLUCOSE UR STRIP.AUTO-MCNC: NEGATIVE MG/DL
HCT VFR BLD AUTO: 38.6 % (ref 35–47)
HGB BLD-MCNC: 12.5 G/DL (ref 11.5–16)
HGB UR QL STRIP: NEGATIVE
HYALINE CASTS URNS QL MICRO: ABNORMAL /LPF (ref 0–2)
IMM GRANULOCYTES # BLD AUTO: 0 K/UL (ref 0–0.04)
IMM GRANULOCYTES NFR BLD AUTO: 1 % (ref 0–0.5)
KETONES UR QL STRIP.AUTO: ABNORMAL MG/DL
LACTATE BLD-SCNC: 0.72 MMOL/L (ref 0.4–2)
LEUKOCYTE ESTERASE UR QL STRIP.AUTO: ABNORMAL
LYMPHOCYTES # BLD: 1 K/UL (ref 0.8–3.5)
LYMPHOCYTES NFR BLD: 14 % (ref 12–49)
MCH RBC QN AUTO: 28.6 PG (ref 26–34)
MCHC RBC AUTO-ENTMCNC: 32.4 G/DL (ref 30–36.5)
MCV RBC AUTO: 88.3 FL (ref 80–99)
MONOCYTES # BLD: 0.7 K/UL (ref 0–1)
MONOCYTES NFR BLD: 10 % (ref 5–13)
NEUTS SEG # BLD: 5.2 K/UL (ref 1.8–8)
NEUTS SEG NFR BLD: 75 % (ref 32–75)
NITRITE UR QL STRIP.AUTO: NEGATIVE
NRBC # BLD: 0 K/UL (ref 0–0.01)
NRBC BLD-RTO: 0 PER 100 WBC
PH UR STRIP: 5 (ref 5–8)
PLATELET # BLD AUTO: 220 K/UL (ref 150–400)
PMV BLD AUTO: 12.3 FL (ref 8.9–12.9)
PROT UR STRIP-MCNC: 100 MG/DL
RBC # BLD AUTO: 4.37 M/UL (ref 3.8–5.2)
RBC #/AREA URNS HPF: ABNORMAL /HPF (ref 0–5)
SARS-COV-2 RDRP RESP QL NAA+PROBE: DETECTED
SOURCE: ABNORMAL
SP GR UR REFRACTOMETRY: 1.02
SPECIMEN HOLD: NORMAL
URINE CULTURE IF INDICATED: ABNORMAL
UROBILINOGEN UR QL STRIP.AUTO: 0.2 EU/DL (ref 0.2–1)
WBC # BLD AUTO: 7 K/UL (ref 3.6–11)
WBC URNS QL MICRO: ABNORMAL /HPF (ref 0–4)

## 2023-12-29 PROCEDURE — 85025 COMPLETE CBC W/AUTO DIFF WBC: CPT

## 2023-12-29 PROCEDURE — 36415 COLL VENOUS BLD VENIPUNCTURE: CPT

## 2023-12-29 PROCEDURE — 99285 EMERGENCY DEPT VISIT HI MDM: CPT

## 2023-12-29 PROCEDURE — 87635 SARS-COV-2 COVID-19 AMP PRB: CPT

## 2023-12-29 PROCEDURE — 71045 X-RAY EXAM CHEST 1 VIEW: CPT

## 2023-12-29 PROCEDURE — 96360 HYDRATION IV INFUSION INIT: CPT

## 2023-12-29 PROCEDURE — 87804 INFLUENZA ASSAY W/OPTIC: CPT

## 2023-12-29 PROCEDURE — 93005 ELECTROCARDIOGRAM TRACING: CPT | Performed by: EMERGENCY MEDICINE

## 2023-12-29 PROCEDURE — 81001 URINALYSIS AUTO W/SCOPE: CPT

## 2023-12-29 PROCEDURE — 70450 CT HEAD/BRAIN W/O DYE: CPT

## 2023-12-29 PROCEDURE — 84484 ASSAY OF TROPONIN QUANT: CPT

## 2023-12-29 PROCEDURE — 2580000003 HC RX 258: Performed by: EMERGENCY MEDICINE

## 2023-12-29 PROCEDURE — 6370000000 HC RX 637 (ALT 250 FOR IP): Performed by: EMERGENCY MEDICINE

## 2023-12-29 PROCEDURE — 87086 URINE CULTURE/COLONY COUNT: CPT

## 2023-12-29 PROCEDURE — 80053 COMPREHEN METABOLIC PANEL: CPT

## 2023-12-29 PROCEDURE — 83605 ASSAY OF LACTIC ACID: CPT

## 2023-12-29 PROCEDURE — 96361 HYDRATE IV INFUSION ADD-ON: CPT

## 2023-12-29 PROCEDURE — 2700000000 HC OXYGEN THERAPY PER DAY

## 2023-12-29 RX ORDER — GABAPENTIN 300 MG/1
300 CAPSULE ORAL NIGHTLY
Status: DISCONTINUED | OUTPATIENT
Start: 2023-12-29 | End: 2023-12-30

## 2023-12-29 RX ORDER — 0.9 % SODIUM CHLORIDE 0.9 %
500 INTRAVENOUS SOLUTION INTRAVENOUS ONCE
Status: COMPLETED | OUTPATIENT
Start: 2023-12-29 | End: 2023-12-30

## 2023-12-29 RX ADMIN — GABAPENTIN 300 MG: 300 CAPSULE ORAL at 22:56

## 2023-12-29 RX ADMIN — SODIUM CHLORIDE 500 ML: 9 INJECTION, SOLUTION INTRAVENOUS at 21:35

## 2023-12-29 ASSESSMENT — LIFESTYLE VARIABLES
HOW MANY STANDARD DRINKS CONTAINING ALCOHOL DO YOU HAVE ON A TYPICAL DAY: PATIENT DOES NOT DRINK
HOW OFTEN DO YOU HAVE A DRINK CONTAINING ALCOHOL: NEVER

## 2023-12-30 ENCOUNTER — APPOINTMENT (OUTPATIENT)
Facility: HOSPITAL | Age: 88
DRG: 177 | End: 2023-12-30
Payer: MEDICARE

## 2023-12-30 PROBLEM — I21.4 NON-ST ELEVATION MI (NSTEMI) (HCC): Status: ACTIVE | Noted: 2023-12-30

## 2023-12-30 LAB
ALBUMIN SERPL-MCNC: 2.8 G/DL (ref 3.5–5)
ALBUMIN SERPL-MCNC: 3.3 G/DL (ref 3.5–5)
ALBUMIN/GLOB SERPL: 0.7 (ref 1.1–2.2)
ALBUMIN/GLOB SERPL: 0.7 (ref 1.1–2.2)
ALP SERPL-CCNC: 85 U/L (ref 45–117)
ALP SERPL-CCNC: 95 U/L (ref 45–117)
ALT SERPL-CCNC: 18 U/L (ref 12–78)
ALT SERPL-CCNC: 26 U/L (ref 12–78)
ANION GAP BLD CALC-SCNC: ABNORMAL MMOL/L (ref 10–20)
ANION GAP SERPL CALC-SCNC: 12 MMOL/L (ref 5–15)
ANION GAP SERPL CALC-SCNC: 8 MMOL/L (ref 5–15)
AST SERPL-CCNC: 47 U/L (ref 15–37)
AST SERPL-CCNC: 60 U/L (ref 15–37)
BASOPHILS # BLD: 0 K/UL (ref 0–0.1)
BASOPHILS NFR BLD: 0 % (ref 0–1)
BILIRUB SERPL-MCNC: 0.2 MG/DL (ref 0.2–1)
BILIRUB SERPL-MCNC: 0.3 MG/DL (ref 0.2–1)
BUN SERPL-MCNC: 20 MG/DL (ref 6–20)
BUN SERPL-MCNC: 22 MG/DL (ref 6–20)
BUN/CREAT SERPL: 18 (ref 12–20)
BUN/CREAT SERPL: 19 (ref 12–20)
CA-I BLD-MCNC: 1.16 MMOL/L (ref 1.12–1.32)
CALCIUM SERPL-MCNC: 8.7 MG/DL (ref 8.5–10.1)
CALCIUM SERPL-MCNC: 9.5 MG/DL (ref 8.5–10.1)
CHLORIDE BLD-SCNC: 105 MMOL/L (ref 98–107)
CHLORIDE SERPL-SCNC: 103 MMOL/L (ref 97–108)
CHLORIDE SERPL-SCNC: 104 MMOL/L (ref 97–108)
CO2 SERPL-SCNC: 20 MMOL/L (ref 21–32)
CO2 SERPL-SCNC: 22 MMOL/L (ref 21–32)
CREAT BLD-MCNC: 0.87 MG/DL (ref 0.6–1.3)
CREAT SERPL-MCNC: 1.08 MG/DL (ref 0.55–1.02)
CREAT SERPL-MCNC: 1.24 MG/DL (ref 0.55–1.02)
CRP SERPL-MCNC: 5.01 MG/DL (ref 0–0.6)
D DIMER PPP FEU-MCNC: 3.42 MG/L FEU (ref 0–0.65)
DIFFERENTIAL METHOD BLD: NORMAL
EKG ATRIAL RATE: 94 BPM
EKG DIAGNOSIS: NORMAL
EKG P AXIS: 62 DEGREES
EKG P-R INTERVAL: 196 MS
EKG Q-T INTERVAL: 376 MS
EKG QRS DURATION: 96 MS
EKG QTC CALCULATION (BAZETT): 470 MS
EKG R AXIS: -36 DEGREES
EKG T AXIS: 146 DEGREES
EKG VENTRICULAR RATE: 94 BPM
EOSINOPHIL # BLD: 0 K/UL (ref 0–0.4)
EOSINOPHIL NFR BLD: 0 % (ref 0–7)
ERYTHROCYTE [DISTWIDTH] IN BLOOD BY AUTOMATED COUNT: 14.4 % (ref 11.5–14.5)
GLOBULIN SER CALC-MCNC: 4.1 G/DL (ref 2–4)
GLOBULIN SER CALC-MCNC: 4.5 G/DL (ref 2–4)
GLUCOSE BLD-MCNC: 98 MG/DL (ref 65–100)
GLUCOSE SERPL-MCNC: 103 MG/DL (ref 65–100)
GLUCOSE SERPL-MCNC: 103 MG/DL (ref 65–100)
HCT VFR BLD AUTO: 36.1 % (ref 35–47)
HGB BLD-MCNC: 11.6 G/DL (ref 11.5–16)
IMM GRANULOCYTES # BLD AUTO: 0 K/UL (ref 0–0.04)
IMM GRANULOCYTES NFR BLD AUTO: 0 % (ref 0–0.5)
LYMPHOCYTES # BLD: 1.1 K/UL (ref 0.8–3.5)
LYMPHOCYTES NFR BLD: 16 % (ref 12–49)
MCH RBC QN AUTO: 28.2 PG (ref 26–34)
MCHC RBC AUTO-ENTMCNC: 32.1 G/DL (ref 30–36.5)
MCV RBC AUTO: 87.6 FL (ref 80–99)
MONOCYTES # BLD: 0.8 K/UL (ref 0–1)
MONOCYTES NFR BLD: 11 % (ref 5–13)
NEUTS SEG # BLD: 5 K/UL (ref 1.8–8)
NEUTS SEG NFR BLD: 73 % (ref 32–75)
NRBC # BLD: 0 K/UL (ref 0–0.01)
NRBC BLD-RTO: 0 PER 100 WBC
PLATELET # BLD AUTO: 213 K/UL (ref 150–400)
PMV BLD AUTO: 11.7 FL (ref 8.9–12.9)
POTASSIUM BLD-SCNC: 4.1 MMOL/L (ref 3.5–5.1)
POTASSIUM SERPL-SCNC: 4 MMOL/L (ref 3.5–5.1)
POTASSIUM SERPL-SCNC: 4.6 MMOL/L (ref 3.5–5.1)
PROCALCITONIN SERPL-MCNC: <0.05 NG/ML
PROT SERPL-MCNC: 6.9 G/DL (ref 6.4–8.2)
PROT SERPL-MCNC: 7.8 G/DL (ref 6.4–8.2)
RBC # BLD AUTO: 4.12 M/UL (ref 3.8–5.2)
SERVICE CMNT-IMP: ABNORMAL
SODIUM BLD-SCNC: 137 MMOL/L (ref 136–145)
SODIUM SERPL-SCNC: 133 MMOL/L (ref 136–145)
SODIUM SERPL-SCNC: 136 MMOL/L (ref 136–145)
TROPONIN I SERPL HS-MCNC: 6073 NG/L (ref 0–51)
WBC # BLD AUTO: 7 K/UL (ref 3.6–11)

## 2023-12-30 PROCEDURE — 6360000004 HC RX CONTRAST MEDICATION: Performed by: STUDENT IN AN ORGANIZED HEALTH CARE EDUCATION/TRAINING PROGRAM

## 2023-12-30 PROCEDURE — 80047 BASIC METABLC PNL IONIZED CA: CPT

## 2023-12-30 PROCEDURE — 71275 CT ANGIOGRAPHY CHEST: CPT

## 2023-12-30 PROCEDURE — 85379 FIBRIN DEGRADATION QUANT: CPT

## 2023-12-30 PROCEDURE — 99233 SBSQ HOSP IP/OBS HIGH 50: CPT | Performed by: FAMILY MEDICINE

## 2023-12-30 PROCEDURE — 84145 PROCALCITONIN (PCT): CPT

## 2023-12-30 PROCEDURE — 80053 COMPREHEN METABOLIC PANEL: CPT

## 2023-12-30 PROCEDURE — 6370000000 HC RX 637 (ALT 250 FOR IP): Performed by: INTERNAL MEDICINE

## 2023-12-30 PROCEDURE — 6370000000 HC RX 637 (ALT 250 FOR IP): Performed by: FAMILY MEDICINE

## 2023-12-30 PROCEDURE — 2580000003 HC RX 258: Performed by: INTERNAL MEDICINE

## 2023-12-30 PROCEDURE — 86140 C-REACTIVE PROTEIN: CPT

## 2023-12-30 PROCEDURE — 85025 COMPLETE CBC W/AUTO DIFF WBC: CPT

## 2023-12-30 PROCEDURE — 6360000002 HC RX W HCPCS: Performed by: INTERNAL MEDICINE

## 2023-12-30 PROCEDURE — 36415 COLL VENOUS BLD VENIPUNCTURE: CPT

## 2023-12-30 PROCEDURE — 1100000003 HC PRIVATE W/ TELEMETRY

## 2023-12-30 PROCEDURE — 2700000000 HC OXYGEN THERAPY PER DAY

## 2023-12-30 RX ORDER — ONDANSETRON 2 MG/ML
4 INJECTION INTRAMUSCULAR; INTRAVENOUS EVERY 4 HOURS PRN
Status: DISCONTINUED | OUTPATIENT
Start: 2023-12-30 | End: 2024-01-01

## 2023-12-30 RX ORDER — SODIUM CHLORIDE 0.9 % (FLUSH) 0.9 %
5-40 SYRINGE (ML) INJECTION PRN
Status: DISCONTINUED | OUTPATIENT
Start: 2023-12-30 | End: 2024-01-04 | Stop reason: HOSPADM

## 2023-12-30 RX ORDER — ASPIRIN 81 MG/1
81 TABLET, CHEWABLE ORAL DAILY
Status: DISCONTINUED | OUTPATIENT
Start: 2023-12-30 | End: 2024-01-04 | Stop reason: HOSPADM

## 2023-12-30 RX ORDER — METOPROLOL SUCCINATE 25 MG/1
25 TABLET, EXTENDED RELEASE ORAL DAILY
Status: DISCONTINUED | OUTPATIENT
Start: 2023-12-30 | End: 2024-01-04 | Stop reason: HOSPADM

## 2023-12-30 RX ORDER — SODIUM CHLORIDE 9 MG/ML
INJECTION, SOLUTION INTRAVENOUS PRN
Status: DISCONTINUED | OUTPATIENT
Start: 2023-12-30 | End: 2024-01-04 | Stop reason: HOSPADM

## 2023-12-30 RX ORDER — GABAPENTIN 300 MG/1
300 CAPSULE ORAL 3 TIMES DAILY
Status: DISCONTINUED | OUTPATIENT
Start: 2023-12-30 | End: 2024-01-01

## 2023-12-30 RX ORDER — ACETAMINOPHEN 325 MG/1
650 TABLET ORAL EVERY 6 HOURS PRN
Status: DISCONTINUED | OUTPATIENT
Start: 2023-12-30 | End: 2024-01-04 | Stop reason: HOSPADM

## 2023-12-30 RX ORDER — DEXAMETHASONE 4 MG/1
6 TABLET ORAL DAILY
Status: DISCONTINUED | OUTPATIENT
Start: 2023-12-30 | End: 2023-12-31

## 2023-12-30 RX ORDER — ONDANSETRON 4 MG/1
4 TABLET, ORALLY DISINTEGRATING ORAL EVERY 8 HOURS PRN
Status: DISCONTINUED | OUTPATIENT
Start: 2023-12-30 | End: 2024-01-04 | Stop reason: HOSPADM

## 2023-12-30 RX ORDER — LEVOTHYROXINE SODIUM 0.07 MG/1
75 TABLET ORAL DAILY
Status: DISCONTINUED | OUTPATIENT
Start: 2023-12-30 | End: 2024-01-04 | Stop reason: HOSPADM

## 2023-12-30 RX ORDER — ONDANSETRON 2 MG/ML
4 INJECTION INTRAMUSCULAR; INTRAVENOUS EVERY 6 HOURS PRN
Status: DISCONTINUED | OUTPATIENT
Start: 2023-12-30 | End: 2024-01-04 | Stop reason: HOSPADM

## 2023-12-30 RX ORDER — ATORVASTATIN CALCIUM 20 MG/1
20 TABLET, FILM COATED ORAL DAILY
Status: DISCONTINUED | OUTPATIENT
Start: 2023-12-30 | End: 2024-01-04 | Stop reason: HOSPADM

## 2023-12-30 RX ORDER — ENOXAPARIN SODIUM 100 MG/ML
30 INJECTION SUBCUTANEOUS DAILY
Status: DISCONTINUED | OUTPATIENT
Start: 2023-12-30 | End: 2023-12-30

## 2023-12-30 RX ORDER — ACETAMINOPHEN 650 MG/1
650 SUPPOSITORY RECTAL EVERY 6 HOURS PRN
Status: DISCONTINUED | OUTPATIENT
Start: 2023-12-30 | End: 2024-01-04 | Stop reason: HOSPADM

## 2023-12-30 RX ORDER — BISACODYL 10 MG
10 SUPPOSITORY, RECTAL RECTAL DAILY PRN
Status: DISCONTINUED | OUTPATIENT
Start: 2023-12-30 | End: 2024-01-04 | Stop reason: HOSPADM

## 2023-12-30 RX ORDER — POLYETHYLENE GLYCOL 3350 17 G/17G
17 POWDER, FOR SOLUTION ORAL DAILY
Status: DISCONTINUED | OUTPATIENT
Start: 2023-12-30 | End: 2024-01-04 | Stop reason: HOSPADM

## 2023-12-30 RX ORDER — SODIUM CHLORIDE 0.9 % (FLUSH) 0.9 %
5-40 SYRINGE (ML) INJECTION EVERY 12 HOURS SCHEDULED
Status: DISCONTINUED | OUTPATIENT
Start: 2023-12-30 | End: 2024-01-04 | Stop reason: HOSPADM

## 2023-12-30 RX ORDER — ASPIRIN 81 MG/1
81 TABLET, CHEWABLE ORAL DAILY
Status: DISCONTINUED | OUTPATIENT
Start: 2023-12-30 | End: 2023-12-30

## 2023-12-30 RX ADMIN — POLYETHYLENE GLYCOL 3350 17 G: 17 POWDER, FOR SOLUTION ORAL at 10:16

## 2023-12-30 RX ADMIN — IOPAMIDOL 100 ML: 755 INJECTION, SOLUTION INTRAVENOUS at 02:08

## 2023-12-30 RX ADMIN — DEXAMETHASONE 6 MG: 4 TABLET ORAL at 10:18

## 2023-12-30 RX ADMIN — SODIUM CHLORIDE, PRESERVATIVE FREE 10 ML: 5 INJECTION INTRAVENOUS at 21:48

## 2023-12-30 RX ADMIN — ASPIRIN 81 MG: 81 TABLET, CHEWABLE ORAL at 10:16

## 2023-12-30 RX ADMIN — GABAPENTIN 300 MG: 300 CAPSULE ORAL at 21:30

## 2023-12-30 RX ADMIN — GABAPENTIN 300 MG: 300 CAPSULE ORAL at 14:53

## 2023-12-30 RX ADMIN — SODIUM CHLORIDE, PRESERVATIVE FREE 10 ML: 5 INJECTION INTRAVENOUS at 10:20

## 2023-12-30 ASSESSMENT — PAIN SCALES - GENERAL: PAINLEVEL_OUTOF10: 0

## 2023-12-30 NOTE — PROGRESS NOTES
Admit Date: 12/29/2023  Hospital day 2    Subjective:     Patient has no complaint of chest pain dyspnea, sweating. Seen with her 2 sons this pm. Pt has been falling down frequently at home the last 3 days, which is rare for her, broguht to ER , has been found to have markedly elevated troponin, abnormal EKG, and positive covid test. She is DNR also has a bleeding disorder according to sons, aparetntly almost bled to death after a minor surgical procedure in the past. They say that she shouldn't take aspirin, but it has been given to her in the past qod. Oxygen level around 90, so is now on decadron. ..   Medication side effects: none    Scheduled Meds:   sodium chloride flush  5-40 mL IntraVENous 2 times per day    polyethylene glycol  17 g Oral Daily    dexAMETHasone  6 mg Oral Daily    levothyroxine  75 mcg Oral Daily    gabapentin  300 mg Oral TID    aspirin  81 mg Oral Daily    metoprolol succinate  25 mg Oral Daily    atorvastatin  20 mg Oral Daily     Continuous Infusions:   sodium chloride       PRN Meds:ondansetron, sodium chloride flush, sodium chloride, ondansetron **OR** ondansetron, bisacodyl, acetaminophen **OR** acetaminophen    Review of Systems  Pertinent items are noted in HPI.    Objective:     Patient Vitals for the past 8 hrs:   BP Temp Temp src Pulse Resp SpO2   12/30/23 1145 110/79 98.6 °F (37 °C) Oral 88 30 --   12/30/23 0806 -- -- -- 97 -- --   12/30/23 0801 126/66 98.8 °F (37.1 °C) Oral 96 24 96 %     No intake/output data recorded.  No intake/output data recorded.    /79   Pulse 88   Temp 98.6 °F (37 °C) (Oral)   Resp 30   Wt 66.9 kg (147 lb 7.8 oz)   SpO2 96%   BMI 24.54 kg/m²   Lungs: clear to auscultation bilaterally  Heart: regular rate and rhythm, S1, S2 normal, no murmur, click, rub or gallop  Abdomen: soft, non-tender; bowel sounds normal; no masses,  no organomegaly  Extremities: extremities normal, atraumatic, no cyanosis or edema    ECG: normal sinus rhythm, no blocks

## 2023-12-30 NOTE — ED NOTES
TRANSFER - OUT REPORT:    Verbal report given to Martha RN on María Nuno  being transferred to 2123 for routine progression of patient care       Report consisted of patient's Situation, Background, Assessment and   Recommendations(SBAR).     Information from the following report(s) ED Encounter Summary and ED SBAR was reviewed with the receiving nurse.    [place] Fall Assessment:    Presents to emergency department  because of falls (Syncope, seizure, or loss of consciousness): Yes  Age > 70: Yes  Altered Mental Status, Intoxication with alcohol or substance confusion (Disorientation, impaired judgment, poor safety awaremess, or inability to follow instructions): No  Impaired Mobility: Ambulates or transfers with assistive devices or assistance; Unable to ambulate or transer.: Yes  Nursing Judgement: No          Lines:   Peripheral IV 12/30/23 Distal;Left Cephalic (Active)   Site Assessment Clean, dry & intact 12/30/23 1618   Line Status Capped 12/30/23 1618   Line Care Connections checked and tightened 12/30/23 1618   Phlebitis Assessment No symptoms 12/30/23 1618   Infiltration Assessment 0 12/30/23 1618   Alcohol Cap Used Yes 12/30/23 1618   Dressing Status Clean;Dry;Intact 12/30/23 1618   Dressing Type Transparent 12/30/23 1618        Opportunity for questions and clarification was provided.      Patient transported with:  Monitor and O2 @ 2lpm

## 2023-12-30 NOTE — ED NOTES
Patient wants to go home, removed her IV line and cardiac monitor. Explained and reassured patient.

## 2023-12-30 NOTE — PLAN OF CARE
Pt in stable condition.  NC O2 2L/min, sats upper 90s, no sob, no congestion observed, afebrile.  NSR on monitor, denies chest pain.  Skin intact, incontinent of urine, skin care provided Q4hrs and prn.  Pt repositioned Q2Hrs.  Maintained Droplet plus precautions for COVID.  Discussed plan of care with patient and patient's family; verbalizes understanding.  Reinforced fall prevention plan with patient reoriented pt to call bell during hourly rounding.  Shift handoff provided ANIA Fink; discussion included assessment, medications, MAR, labs and plan of care.

## 2023-12-30 NOTE — ED PROVIDER NOTES
[facility] EMERGENCY DEPT  EMERGENCY DEPARTMENT ENCOUNTER       Pt Name: María Nuno  MRN: [id number]  Birthdate 4/2/1921  Date of evaluation: 12/29/2023  Provider: Horacio Madrigal MD   PCP: Iban Cortez MD  Note Started: 4:07 AM 12/29/23     CHIEF COMPLAINT       Chief Complaint   Patient presents with    Fall     Brought in by EMS with ground level fall, happened 3 times this week according to EMS.  Patient denies any pain at present.        HISTORY OF PRESENT ILLNESS: 1 or more elements      History From: Patient, son, History limited by: Patient's age and dementia     María Nuno is a 89 y.o. female with history of breast cancer, diabetes, hypertension who presents with chief complaint of falls, weakness, loss of appetite.  Symptoms have been present over the past 3-5 days.  Patient typically able to ambulate at home, she lives with her daughter but her daughter and everyone in the house has been sick with COVID-19.  Patient has lost her strength and has not been able to walk on her own, she has lost control of her bowel and bladder.  Patient has not been eating or drinking very well and family concerned about her wellbeing.  They mention a mild cough, denying fevers.  Patient denies any pain.     Nursing Notes were all reviewed and agreed with or any disagreements were addressed in the HPI.     REVIEW OF SYSTEMS        Positives and Pertinent negatives as per HPI.    PAST HISTORY     Past Medical History:  Past Medical History:   Diagnosis Date    Arrhythmia     pvc's    Blurred vision     Cancer (HCC)     BREAST    Coagulation disorder (HCC)     bleeds easily, and clots slowly.    Decubitus ulcer of left heel, unstageable (HCC) 3/16/2016    Diabetes (HCC)     GERD (gastroesophageal reflux disease)     Hypercholesterolemia     Hypothyroidism     IBS (irritable bowel syndrome)     Neuropathy        Past Surgical History:  Past Surgical History:   Procedure Laterality Date    BLADDER SUSPENSION      BREAST  abnormality.  I will evaluate with CBC, CMP, cardiac monitoring, EKG, urinalysis, chest x-ray, viral swabs, CT head, treat with small IV fluid bolus and reassess.    Patient tested positive for COVID-19 infection.  Given ischemic appearing EKG troponin was added on, this is comes back significantly elevated at greater than 6000.  Patient denies any active chest pain.  Given recent COVID infection and concern for hypoxia I will evaluate with CTA chest rule out PE.  I will discuss with hospitalist for admission.    Chest x-ray per my independent interpretation no acute process such as acute infiltrate or pneumothorax, confirmed by radiologist.      ED Course as of 12/30/23 0407   Fri Dec 29, 2023   2131 EKG per my interpretation normal sinus rhythm, rate 94 bpm, normal axis, nonspecific ST-T wave abnormality, no ST segment elevation. [AK]   2206 SARS-CoV-2, Rapid(!!): Detected [AK]      ED Course User Index  [AK] Horacio Madrigal MD         Cardiac Monitoring:  The cardiac monitor revealed the following rhythm as interpreted by me: Normal Sinus Rhythm, rate 95 bpm  The cardiac monitor was ordered secondary to the patient's reported complaint of weakness, hypoxic and to monitor the patient for dysrhythmia.  Horacio Madrigal MD      FINAL IMPRESSION     1. COVID-19    2. NSTEMI (non-ST elevated myocardial infarction) (HCC)          DISPOSITION/PLAN     Admission Note:  Patient is being admitted to the hospital by Dr. Ruano, Service: Hospitalist.  The results of their tests and reasons for their admission have been discussed with them and available family. They convey agreement and understanding for the need to be admitted and for their admission diagnosis.       CLINICAL IMPRESSION    1. COVID-19    2. NSTEMI (non-ST elevated myocardial infarction) (HCC)         DISPOSITION  Admit     I am the Primary Clinician of Record.   Horacio Madrigal MD (electronically signed)    (Please note that parts of this dictation were  completed with voice recognition software. Quite often unanticipated grammatical, syntax, homophones, and other interpretive errors are inadvertently transcribed by the computer software. Please disregards these errors. Please excuse any errors that have escaped final proofreading.)       Horacio Madrigal MD  12/30/23 0415

## 2023-12-30 NOTE — ED NOTES
Called Lab multiple times regarding CMP result as it is showing \"needs to be collected\", according to Lab they are working on it and no need to send a blood sample.

## 2023-12-30 NOTE — ED NOTES
Patient arrived via EMS, moving all limbs, no external injuries noted, oriented to person and place.  Patient put on cardiac monitoring.

## 2023-12-30 NOTE — H&P
Hospitalist Admission Note    NAME:   María Nuno   : 1921   MRN: [id number]     Date/Time: 2023 2:20 AM    Patient PCP: Iban Cortez MD    ______________________________________________________________________  Given the patient's current clinical presentation, I have a high level of concern for decompensation if discharged from the emergency department.  Complex decision making was performed, which includes reviewing the patient's available past medical records, laboratory results, and x-ray films.       My assessment of this patient's clinical condition and my plan of care is as follows.    Assessment / Plan:    COVID-19 infection POA  Borderline hypoxia sats at 90% on room air POA  Elevated troponin 6073 POA  Non ST elevation MI POA  Lives with family, reportedly multiple family members positive for COVID  Brought in with generalized weakness   Decreased ambulation, not able to do ADLs as well  Suspect symptomatic COVID  Now requiring oxygen will start on daily dexamethasone  Check inflammatory markers including procalcitonin and CRP  ASA, statin for now for the MI  Given her age I do not think she be a candidate for invasive workup   Will defer to Dr. Cortez whether cardiology consult is warranted  Wean O2 as tolerated  PT consult    Diabetes mellitus type 2 currently controlled  Blood sugar 103   sliding scale insulin  Watch blood sugars on the steroids    Hypothyroidism   continue levothyroxine    Body mass index is 24.54 kg/m².      Code Status: DNR/DNI  DVT Prophylaxis: lovenox Heparin SCDs  Baseline:     Medical Decision Making:   I personally reviewed labs: CBC, CMP, UA, Troponin  I personally reviewed imaging: CXR  I personally reviewed EKG:  Toxic drug monitoring:  Discussed case with: ED provider. After discussion I am in agreement that acuity of patient's medical condition necessitates hospital stay.    Subjective:   CHIEF COMPLAINT: \"I feel great\", sent in for generalized  Lymphocytes % 14 12 - 49 %    Monocytes % 10 5 - 13 %    Eosinophils % 0 0 - 7 %    Basophils % 0 0 - 1 %    Immature Granulocytes 1 (H) 0.0 - 0.5 %    Neutrophils Absolute 5.2 1.8 - 8.0 K/UL    Lymphocytes Absolute 1.0 0.8 - 3.5 K/UL    Monocytes Absolute 0.7 0.0 - 1.0 K/UL    Eosinophils Absolute 0.0 0.0 - 0.4 K/UL    Basophils Absolute 0.0 0.0 - 0.1 K/UL    Absolute Immature Granulocyte 0.0 0.00 - 0.04 K/UL    Differential Type AUTOMATED     Extra Tubes Hold    Collection Time: 12/29/23  9:27 PM   Result Value Ref Range    Specimen HOld RED,BLUE,SST,PST     Comment:        Add-on orders for these samples will be processed based on acceptable specimen integrity and analyte stability, which may vary by analyte.   Rapid influenza A/B antigens    Collection Time: 12/29/23  9:27 PM    Specimen: Nasopharyngeal   Result Value Ref Range    Influenza A Ag Negative NEG      Influenza B Ag Negative NEG     Comprehensive Metabolic Panel    Collection Time: 12/29/23  9:27 PM   Result Value Ref Range    Sodium 136 136 - 145 mmol/L    Potassium 4.6 3.5 - 5.1 mmol/L    Chloride 104 97 - 108 mmol/L    CO2 20 (L) 21 - 32 mmol/L    Anion Gap 12 5 - 15 mmol/L    Glucose 103 (H) 65 - 100 mg/dL    BUN 22 (H) 6 - 20 MG/DL    Creatinine 1.24 (H) 0.55 - 1.02 MG/DL    Bun/Cre Ratio 18 12 - 20      Est, Glom Filt Rate 38 (L) >60 ml/min/1.73m2    Calcium 9.5 8.5 - 10.1 MG/DL    Total Bilirubin 0.3 0.2 - 1.0 MG/DL    ALT 26 12 - 78 U/L    AST 60 (H) 15 - 37 U/L    Alk Phosphatase 95 45 - 117 U/L    Total Protein 7.8 6.4 - 8.2 g/dL    Albumin 3.3 (L) 3.5 - 5.0 g/dL    Globulin 4.5 (H) 2.0 - 4.0 g/dL    Albumin/Globulin Ratio 0.7 (L) 1.1 - 2.2     COVID-19, Rapid    Collection Time: 12/29/23  9:29 PM    Specimen: Nasopharyngeal   Result Value Ref Range    Source Nasopharyngeal      SARS-CoV-2, Rapid Detected (AA) NOTD     POC Lactic Acid    Collection Time: 12/29/23  9:43 PM   Result Value Ref Range    POC Lactic Acid 0.72 0.40 - 2.00 mmol/L    Urinalysis with Reflex to Culture    Collection Time: 12/29/23 10:29 PM    Specimen: Urine   Result Value Ref Range    Color, UA YELLOW/STRAW      Appearance CLEAR CLEAR      Specific Gravity, UA 1.019      pH, Urine 5.0 5.0 - 8.0      Protein,  (A) NEG mg/dL    Glucose, UA Negative NEG mg/dL    Ketones, Urine TRACE (A) NEG mg/dL    Bilirubin Urine Negative NEG      Blood, Urine Negative NEG      Urobilinogen, Urine 0.2 0.2 - 1.0 EU/dL    Nitrite, Urine Negative NEG      Leukocyte Esterase, Urine TRACE (A) NEG      Urine Culture if Indicated URINE CULTURE ORDERED (A) CNI      WBC, UA 10-20 0 - 4 /hpf    RBC, UA 0-5 0 - 5 /hpf    Epithelial Cells UA FEW FEW /lpf    BACTERIA, URINE Negative NEG /hpf    Hyaline Casts, UA 0-2 0 - 2 /lpf   Troponin    Collection Time: 12/29/23 10:38 PM   Result Value Ref Range    Troponin, High Sensitivity 6,073 (HH) 0 - 51 ng/L   POC CHEM 8    Collection Time: 12/30/23  1:43 AM   Result Value Ref Range    POC Ionized Calcium 1.16 1.12 - 1.32 mmol/L    POC Sodium 137 136 - 145 mmol/L    POC Potassium 4.1 3.5 - 5.1 mmol/L    POC Chloride 105 98 - 107 mmol/L    Anion Gap, POC Cannot be calculated 10 - 20 mmol/L    POC Glucose 98 65 - 100 mg/dL    POC Creatinine 0.87 0.6 - 1.3 mg/dL    eGFR, POC 59 (L) >60 ml/min/1.73m2    UA Comment Comment Not Indicated.     CBC with Auto Differential    Collection Time: 12/30/23  4:40 AM   Result Value Ref Range    WBC 7.0 3.6 - 11.0 K/uL    RBC 4.12 3.80 - 5.20 M/uL    Hemoglobin 11.6 11.5 - 16.0 g/dL    Hematocrit 36.1 35.0 - 47.0 %    MCV 87.6 80.0 - 99.0 FL    MCH 28.2 26.0 - 34.0 PG    MCHC 32.1 30.0 - 36.5 g/dL    RDW 14.4 11.5 - 14.5 %    Platelets 213 150 - 400 K/uL    MPV 11.7 8.9 - 12.9 FL    Nucleated RBCs 0.0 0  WBC    nRBC 0.00 0.00 - 0.01 K/uL    Neutrophils % 73 32 - 75 %    Lymphocytes % 16 12 - 49 %    Monocytes % 11 5 - 13 %    Eosinophils % 0 0 - 7 %    Basophils % 0 0 - 1 %    Immature Granulocytes 0 0.0 - 0.5 %

## 2023-12-30 NOTE — ED NOTES
Report given to ANIA Marrero. Nurse was informed of reason for arrival, vitals, labs, medications, orders, procedures, results, anything left pending and current plan of action. Questions were asked and received prior to departure from the patient.

## 2023-12-30 NOTE — FLOWSHEET NOTE
12/30/23 0112   Treatment Team Notification   Reason for Communication Critical results   Type of Critical Result Laboratory   Critical Lab Information Troponin 6073   Person Result Received From ANIA Monge   Critical Lab Result Type Troponin   Name of Team Member Notified MD Kaitlin   Treatment Team Role Attending Provider   Method of Communication Call   Response No new orders   Notification Time 0113

## 2023-12-30 NOTE — PROGRESS NOTES
PT note:     Orders received and acknowledged. Chart reviewed and noted orders to start 12/31/23 at 0000. Will follow up for PT evaluation.     Chary Shin, PT, DPT

## 2023-12-31 LAB
ALBUMIN SERPL-MCNC: 2.8 G/DL (ref 3.5–5)
ALBUMIN/GLOB SERPL: 0.7 (ref 1.1–2.2)
ALP SERPL-CCNC: 80 U/L (ref 45–117)
ALT SERPL-CCNC: 23 U/L (ref 12–78)
ANION GAP SERPL CALC-SCNC: 7 MMOL/L (ref 5–15)
AST SERPL-CCNC: 50 U/L (ref 15–37)
BACTERIA SPEC CULT: ABNORMAL
BASOPHILS # BLD: 0 K/UL (ref 0–0.1)
BASOPHILS NFR BLD: 0 % (ref 0–1)
BILIRUB SERPL-MCNC: 0.4 MG/DL (ref 0.2–1)
BUN SERPL-MCNC: 29 MG/DL (ref 6–20)
BUN/CREAT SERPL: 27 (ref 12–20)
CALCIUM SERPL-MCNC: 8.9 MG/DL (ref 8.5–10.1)
CC UR VC: ABNORMAL
CHLORIDE SERPL-SCNC: 107 MMOL/L (ref 97–108)
CO2 SERPL-SCNC: 20 MMOL/L (ref 21–32)
CREAT SERPL-MCNC: 1.07 MG/DL (ref 0.55–1.02)
DIFFERENTIAL METHOD BLD: ABNORMAL
EOSINOPHIL # BLD: 0 K/UL (ref 0–0.4)
EOSINOPHIL NFR BLD: 0 % (ref 0–7)
ERYTHROCYTE [DISTWIDTH] IN BLOOD BY AUTOMATED COUNT: 14.2 % (ref 11.5–14.5)
GLOBULIN SER CALC-MCNC: 3.9 G/DL (ref 2–4)
GLUCOSE SERPL-MCNC: 96 MG/DL (ref 65–100)
HCT VFR BLD AUTO: 35.8 % (ref 35–47)
HGB BLD-MCNC: 12.1 G/DL (ref 11.5–16)
IMM GRANULOCYTES # BLD AUTO: 0 K/UL (ref 0–0.04)
IMM GRANULOCYTES NFR BLD AUTO: 1 % (ref 0–0.5)
LYMPHOCYTES # BLD: 1.3 K/UL (ref 0.8–3.5)
LYMPHOCYTES NFR BLD: 20 % (ref 12–49)
MCH RBC QN AUTO: 29.2 PG (ref 26–34)
MCHC RBC AUTO-ENTMCNC: 33.8 G/DL (ref 30–36.5)
MCV RBC AUTO: 86.3 FL (ref 80–99)
MONOCYTES # BLD: 0.7 K/UL (ref 0–1)
MONOCYTES NFR BLD: 11 % (ref 5–13)
NEUTS SEG # BLD: 4.6 K/UL (ref 1.8–8)
NEUTS SEG NFR BLD: 68 % (ref 32–75)
NRBC # BLD: 0 K/UL (ref 0–0.01)
NRBC BLD-RTO: 0 PER 100 WBC
PLATELET # BLD AUTO: 229 K/UL (ref 150–400)
PMV BLD AUTO: 12.2 FL (ref 8.9–12.9)
POTASSIUM SERPL-SCNC: 4.1 MMOL/L (ref 3.5–5.1)
PROT SERPL-MCNC: 6.7 G/DL (ref 6.4–8.2)
RBC # BLD AUTO: 4.15 M/UL (ref 3.8–5.2)
SERVICE CMNT-IMP: ABNORMAL
SODIUM SERPL-SCNC: 134 MMOL/L (ref 136–145)
WBC # BLD AUTO: 6.6 K/UL (ref 3.6–11)

## 2023-12-31 PROCEDURE — 6370000000 HC RX 637 (ALT 250 FOR IP): Performed by: FAMILY MEDICINE

## 2023-12-31 PROCEDURE — 99232 SBSQ HOSP IP/OBS MODERATE 35: CPT | Performed by: FAMILY MEDICINE

## 2023-12-31 PROCEDURE — 2700000000 HC OXYGEN THERAPY PER DAY

## 2023-12-31 PROCEDURE — 36415 COLL VENOUS BLD VENIPUNCTURE: CPT

## 2023-12-31 PROCEDURE — 85025 COMPLETE CBC W/AUTO DIFF WBC: CPT

## 2023-12-31 PROCEDURE — 6360000002 HC RX W HCPCS: Performed by: FAMILY MEDICINE

## 2023-12-31 PROCEDURE — 2580000003 HC RX 258: Performed by: INTERNAL MEDICINE

## 2023-12-31 PROCEDURE — 1100000003 HC PRIVATE W/ TELEMETRY

## 2023-12-31 PROCEDURE — 80053 COMPREHEN METABOLIC PANEL: CPT

## 2023-12-31 RX ORDER — DIAZEPAM 5 MG/ML
2.5 INJECTION, SOLUTION INTRAMUSCULAR; INTRAVENOUS EVERY 8 HOURS PRN
Status: DISCONTINUED | OUTPATIENT
Start: 2023-12-31 | End: 2024-01-04 | Stop reason: HOSPADM

## 2023-12-31 RX ORDER — LORAZEPAM 0.5 MG/1
0.5 TABLET ORAL EVERY 8 HOURS PRN
Status: DISCONTINUED | OUTPATIENT
Start: 2023-12-31 | End: 2024-01-04 | Stop reason: HOSPADM

## 2023-12-31 RX ADMIN — LEVOTHYROXINE SODIUM 75 MCG: 0.07 TABLET ORAL at 06:54

## 2023-12-31 RX ADMIN — GABAPENTIN 300 MG: 300 CAPSULE ORAL at 22:21

## 2023-12-31 RX ADMIN — SODIUM CHLORIDE, PRESERVATIVE FREE 10 ML: 5 INJECTION INTRAVENOUS at 22:21

## 2023-12-31 RX ADMIN — DIAZEPAM 2.5 MG: 5 INJECTION, SOLUTION INTRAMUSCULAR; INTRAVENOUS at 12:44

## 2023-12-31 ASSESSMENT — PAIN SCALES - GENERAL
PAINLEVEL_OUTOF10: 0
PAINLEVEL_OUTOF10: 0

## 2023-12-31 NOTE — CARE COORDINATION
Care Management Initial Assessment       RUR: 11%-\"Low Risk\"  Readmission? No  1st IM letter given? Yes - 12/30/23   1st  letter given: No     The patient uses the [facility] and [facility] for her medications. The patient has been to [facility] in 2015. She has had HH services in the past. At baseline, the patient is relatively independent in her ADLs, however, the patient's daughter reported that over the past 2 weeks her functional status has significantly declined. CM will continue to monitor PT/OT recommendations.        12/31/23 2384   Service Assessment   Patient Orientation Unable to Assess   Cognition Dementia / Early Alzheimer's   History Provided By Child/Family  (CM completed assessment with the patient's daughter, Denise Lin)   Primary Caregiver Family   Support Systems Children  (The patient lives with her 2 daughters: Sierra & Denise. She has 2 sons: 1 lives in the [facility] and her other son lives in [place], [place])   PCP Verified by CM Yes   Last Visit to PCP Within last 3 months   Prior Functional Level Assistance with the following:;Housework;Cooking;Mobility   Current Functional Level Assistance with the following:;Bathing;Dressing;Toileting;Cooking;Housework;Shopping;Mobility   Can patient return to prior living arrangement Yes   Family able to assist with home care needs: Yes   Would you like for me to discuss the discharge plan with any other family members/significant others, and if so, who? Yes  (-Denise Lin, Daughter, Phone: [phone])   Financial Resources None   Community Resources None   Social/Functional History   Lives With Daughter  (The patient lives with her 2 daughters: Sierra and Denise)   Type of Home House   Home Layout Two level;Bed/Bath upstairs   Home Access Stairs to enter with rails   Entrance Stairs - Number of Steps 7   Entrance Stairs - Rails Both   Home Equipment Cane;Walker, rolling;Rollator   Active  No

## 2023-12-31 NOTE — PROGRESS NOTES
Bedside and Verbal shift change report given to Kalpana RN  (oncoming nurse) by Michelle RN (offgoing nurse). Report included the following information Intake/Output, MAR, and Recent Results.

## 2023-12-31 NOTE — PLAN OF CARE
Problem: Safety - Adult  Goal: Free from fall injury  Outcome: Progressing  Flowsheets (Taken 12/31/2023 0820)  Free From Fall Injury:   Instruct family/caregiver on patient safety   Based on caregiver fall risk screen, instruct family/caregiver to ask for assistance with transferring infant if caregiver noted to have fall risk factors     Problem: Respiratory - Adult  Goal: Achieves optimal ventilation and oxygenation  Outcome: Progressing     Problem: Skin/Tissue Integrity - Adult  Goal: Skin integrity remains intact  Outcome: Progressing  Flowsheets (Taken 12/31/2023 0820)  Skin Integrity Remains Intact:   Monitor for areas of redness and/or skin breakdown   Assess vascular access sites hourly     Problem: Infection - Adult  Goal: Absence of infection at discharge  Outcome: Progressing  Flowsheets (Taken 12/31/2023 0820)  Absence of infection at discharge: Assess and monitor for signs and symptoms of infection

## 2023-12-31 NOTE — PROGRESS NOTES
Admit Date: 12/29/2023  Hospital day 3    Subjective:     Patient has no complaint of chest pain or dyspnea. Pt is somewhat delirious this am, agitated, wants to go home. Has pulled out ivs, refusing meds...seen with santa  Medication side effects: none    Scheduled Meds:   sodium chloride flush  5-40 mL IntraVENous 2 times per day    polyethylene glycol  17 g Oral Daily    dexAMETHasone  6 mg Oral Daily    levothyroxine  75 mcg Oral Daily    gabapentin  300 mg Oral TID    aspirin  81 mg Oral Daily    metoprolol succinate  25 mg Oral Daily    atorvastatin  20 mg Oral Daily     Continuous Infusions:   sodium chloride       PRN Meds:LORazepam, ondansetron, sodium chloride flush, sodium chloride, ondansetron **OR** ondansetron, bisacodyl, acetaminophen **OR** acetaminophen    Review of Systems  Review of systems not obtained due to patient factors.    Objective:     Patient Vitals for the past 8 hrs:   BP Temp Temp src Pulse Resp SpO2   12/31/23 1033 126/77 98 °F (36.7 °C) Axillary 89 20 95 %   12/31/23 0820 121/76 -- -- -- -- --     I/O last 3 completed shifts:  In: 700 [P.O.:700]  Out: -   No intake/output data recorded.    Lungs: clear to auscultation bilaterally  Heart: regular rate and rhythm, S1, S2 normal, no murmur, click, rub or gallop  Abdomen: soft, non-tender; bowel sounds normal; no masses,  no organomegaly  Extremities: extremities normal, atraumatic, no cyanosis or edema    ECG: normal sinus rhythm, no blocks or conduction defects, no ischemic changes.   Data ReviewCBC:   Lab Results   Component Value Date/Time    WBC 6.6 12/31/2023 04:30 AM    RBC 4.15 12/31/2023 04:30 AM     BMP:   Lab Results   Component Value Date/Time    GLUCOSE 96 12/31/2023 04:30 AM    CO2 20 12/31/2023 04:30 AM    BUN 29 12/31/2023 04:30 AM    CREATININE 1.07 12/31/2023 04:30 AM    CALCIUM 8.9 12/31/2023 04:30 AM       Assessment:     Principal Problem:    Non-ST elevation MI (NSTEMI) ([facility])  Resolved Problems:    * No resolved  hospital problems. *      Plan:     Markedly elevated troponin level with abnormal EKG- diffuse t wave inversion and st depression. Will treat with metoprolol, aspirin and atorvastatin. I dont think she is a candidate for cardiac cath due to her hx of bleeding disorder and age and delirium.   Positive covid test with hypoxia- currently on nasal oxygen and decadron  Hx of bleeding disorder- will hold lovenox. Mechanical prophylaxis.   Hypothyroidism- continue synthroid.   DNR  Delirium- will try lose dose artivan. As O2 sats are stable on room air- will hold decadron today and see if this helps her delirium.

## 2023-12-31 NOTE — PROGRESS NOTES
Physical Therapy    Chart reviewed. Noted patient very agitated today- refusing care and pulling at leads. Will defer PT evaluation today and attempt tomorrow if patient is medically appropriate.    Cecily Almanzar, PT, DPT

## 2023-12-31 NOTE — PROGRESS NOTES
Bedside shift change report given to ANIA Acuna (oncoming nurse) by ANIA Rogers (offgoing nurse). Report included the following information Nurse Handoff Report, Adult Overview, Intake/Output, MAR, Recent Results, and Quality Measures.     07:00     08:20 Patient refuses her temperature to be checked. Refused her meds.     10:20 Patient agitated, oriented X1,  just to herself; MD Lazo at bedside. Patient continues to refuse her medications. She declines to eat or drink.     11:30 Patient pulled out her telemetry leads; Dr. Lazo informed.     12:00 Patient refuses blood work (order) for sodium level. Dr. Lazo informed.   12:24 Patient is agitated and impulsive. Diazepam 2.5 ml given per PRN order. Back on telemetry.    13:30  Received a call from the Pharmacy Residency Office from [place]. He had concerns regarding patient's urinary results/UTI.  alerted. Also informed MD that patient continues to refuse dl drinks/food. Hasn't been voiding.     14:40 Patient is agitated, impulsive, trying to get up. Verbally abusive    15:00  Continued to refuses reassessment; declines aggressively any food/drinks.     16:00 Refuses VS and reassessment, threatens to hit RN.      17:00 Continues to decline reassessment. Hasn't been MD aleah is aware.     17:30 Sleeping in bed.     18:20 Agitated; had an episode of urinary (voided, pad saturated) and bowel incontinence.

## 2024-01-01 LAB
CHOLEST SERPL-MCNC: 210 MG/DL
HDLC SERPL-MCNC: 24 MG/DL
HDLC SERPL: 8.8 (ref 0–5)
LDLC SERPL CALC-MCNC: 134.8 MG/DL (ref 0–100)
TRIGL SERPL-MCNC: 256 MG/DL
TROPONIN I SERPL HS-MCNC: 2073 NG/L (ref 0–51)
VLDLC SERPL CALC-MCNC: 51.2 MG/DL

## 2024-01-01 PROCEDURE — 6370000000 HC RX 637 (ALT 250 FOR IP): Performed by: FAMILY MEDICINE

## 2024-01-01 PROCEDURE — 80061 LIPID PANEL: CPT

## 2024-01-01 PROCEDURE — 1100000003 HC PRIVATE W/ TELEMETRY

## 2024-01-01 PROCEDURE — 84484 ASSAY OF TROPONIN QUANT: CPT

## 2024-01-01 PROCEDURE — 2580000003 HC RX 258: Performed by: INTERNAL MEDICINE

## 2024-01-01 PROCEDURE — 97162 PT EVAL MOD COMPLEX 30 MIN: CPT

## 2024-01-01 PROCEDURE — 99232 SBSQ HOSP IP/OBS MODERATE 35: CPT | Performed by: FAMILY MEDICINE

## 2024-01-01 PROCEDURE — 6370000000 HC RX 637 (ALT 250 FOR IP): Performed by: INTERNAL MEDICINE

## 2024-01-01 PROCEDURE — 97530 THERAPEUTIC ACTIVITIES: CPT

## 2024-01-01 PROCEDURE — 36415 COLL VENOUS BLD VENIPUNCTURE: CPT

## 2024-01-01 RX ORDER — CASTOR OIL AND BALSAM, PERU 788; 87 MG/G; MG/G
OINTMENT TOPICAL 2 TIMES DAILY
Status: DISCONTINUED | OUTPATIENT
Start: 2024-01-01 | End: 2024-01-04 | Stop reason: HOSPADM

## 2024-01-01 RX ORDER — GABAPENTIN 300 MG/1
300 CAPSULE ORAL 2 TIMES DAILY
Status: DISCONTINUED | OUTPATIENT
Start: 2024-01-01 | End: 2024-01-04 | Stop reason: HOSPADM

## 2024-01-01 RX ADMIN — GABAPENTIN 300 MG: 300 CAPSULE ORAL at 10:12

## 2024-01-01 RX ADMIN — METOPROLOL SUCCINATE 25 MG: 25 TABLET, EXTENDED RELEASE ORAL at 10:12

## 2024-01-01 RX ADMIN — LEVOTHYROXINE SODIUM 75 MCG: 0.07 TABLET ORAL at 06:15

## 2024-01-01 RX ADMIN — ASPIRIN 81 MG: 81 TABLET, CHEWABLE ORAL at 10:12

## 2024-01-01 RX ADMIN — SODIUM CHLORIDE, PRESERVATIVE FREE 10 ML: 5 INJECTION INTRAVENOUS at 20:21

## 2024-01-01 RX ADMIN — GABAPENTIN 300 MG: 300 CAPSULE ORAL at 20:21

## 2024-01-01 RX ADMIN — Medication: at 20:20

## 2024-01-01 RX ADMIN — Medication: at 12:16

## 2024-01-01 RX ADMIN — SODIUM CHLORIDE, PRESERVATIVE FREE 10 ML: 5 INJECTION INTRAVENOUS at 10:13

## 2024-01-01 RX ADMIN — ATORVASTATIN CALCIUM 20 MG: 20 TABLET, FILM COATED ORAL at 10:12

## 2024-01-01 ASSESSMENT — PAIN SCALES - GENERAL
PAINLEVEL_OUTOF10: 0
PAINLEVEL_OUTOF10: 0

## 2024-01-01 NOTE — PROGRESS NOTES
Admit Date: 12/29/2023  Hospital day 3    Subjective:     Patient has no complaint of shortness of breath or chest pains. Overall is much calmer today. No longer delirious. Discussed with daughter who she lives with this am. Main problem at home is that she has been falling down for the last 2 weeks, and daughter cannot get her up. She is hopeful that Ms. Nuno will be able to walk well again without falling. ..   Medication side effects: none    Scheduled Meds:   balsum peru-castor oil   Topical BID    sodium chloride flush  5-40 mL IntraVENous 2 times per day    polyethylene glycol  17 g Oral Daily    levothyroxine  75 mcg Oral Daily    gabapentin  300 mg Oral TID    aspirin  81 mg Oral Daily    metoprolol succinate  25 mg Oral Daily    atorvastatin  20 mg Oral Daily     Continuous Infusions:   sodium chloride       PRN Meds:LORazepam, diazePAM, ondansetron, sodium chloride flush, sodium chloride, ondansetron **OR** ondansetron, bisacodyl, acetaminophen **OR** acetaminophen    Review of Systems  Pertinent items are noted in HPI.    Objective:     Patient Vitals for the past 8 hrs:   BP Temp Temp src Pulse Resp SpO2 Weight   01/01/24 1012 117/68 -- -- 85 -- -- --   01/01/24 0845 117/68 97.9 °F (36.6 °C) Axillary 85 26 94 % --   01/01/24 0600 -- -- -- -- -- -- 59.6 kg (131 lb 8 oz)   01/01/24 0411 121/64 -- -- 82 20 93 % --     I/O last 3 completed shifts:  In: 100 [P.O.:100]  Out: -   I/O this shift:  In: 5 [I.V.:5]  Out: -     /68   Pulse 85   Temp 97.9 °F (36.6 °C) (Axillary)   Resp 26   Wt 59.6 kg (131 lb 8 oz)   SpO2 94%   BMI 21.88 kg/m²   Lungs: clear to auscultation bilaterally  Heart: regular rate and rhythm, S1, S2 normal, no murmur, click, rub or gallop  Abdomen: soft, non-tender; bowel sounds normal; no masses,  no organomegaly  Extremities: extremities normal, atraumatic, no cyanosis or edema    ECG: normal sinus rhythm, no blocks or conduction defects, no ischemic changes.   Data  ReviewCBC:   Lab Results   Component Value Date/Time    WBC 6.6 12/31/2023 04:30 AM    RBC 4.15 12/31/2023 04:30 AM     BMP:   Lab Results   Component Value Date/Time    GLUCOSE 96 12/31/2023 04:30 AM    CO2 20 12/31/2023 04:30 AM    BUN 29 12/31/2023 04:30 AM    CREATININE 1.07 12/31/2023 04:30 AM    CALCIUM 8.9 12/31/2023 04:30 AM       Assessment:     Principal Problem:    Non-ST elevation MI (NSTEMI) ([facility])  Resolved Problems:    * No resolved hospital problems. *      Plan:     Markedly elevated troponin level with abnormal EKG- diffuse t wave inversion and st depression. Will treat with metoprolol, aspirin and atorvastatin. I dont think she is a candidate for cardiac cath due to her hx of bleeding disorder and age and delirium. will discuss with Dr. Cortez.   Positive covid test with mild hypoxia- currently on nasal oxygen. Was initially started on decadron, but have held this due to her delirium.   Hx of bleeding disorder- will hold lovenox. Mechanical prophylaxis.   Hypothyroidism- continue synthroid.   DNR  Delirium- will try lose dose artivan. As O2 sats are stable on room air- will hold decadron today and see if this helps her delirium. she is much calmer today. Would like to keep her off decadron.   Recurrent falls at home the last 2 weeks- daughter has been unable to get her up. That prompted her to come to ER. PT to see. If this problem improved, daughter thinks she could probably take her back home. Will get case management involved. Will likely need shot term lisa at SNF (vs home PT)

## 2024-01-01 NOTE — PROGRESS NOTES
0730  Change of shift report received at this time.  Patient is COVID +.  Admitted for increased weakness and decreased PO intake.  Lives with family.      0845  Shift assessment.  Purewick removed at this time.  Redness with moisture left breast fold, blanchable redness both heels, red open dry areas bilateral groin, blanchable redness sacrum; purple color discoloration intact left buttock.  Hemorrhoids.  Incontinent of urine and stool.  Alert.  Oriented to person only.  Anxious but cooperative. Patient placed back on oxygen 3 liters for hypoxia during turning for incontience care oxygen dropped to 80s.  Patient intermittently hypoxia <90% when removes her oxygen but back >90% when oxygen replaced.  Confusion at all times.      1105  Reassessment.    1218  Pictures obtained of skin changes documented above.  Venelex ordered.  Wound consult to be ordered.      1226  Labs drawn and sent as ordered.    1345   notified of elevated troponin by Carmella JORGE.  See flowsheet documentation.  No new orders.    1509  Reassessment.  PO oral intake remains poor.  <25% each meal today.    1940  End of Shift Note    Bedside shift change report given to Tory (oncoming nurse) by Jocelin Presley RN (offgoing nurse).  Report included the following information SBAR, Kardex, Intake/Output, MAR, Recent Results, Cardiac Rhythm NSR, and Quality Measures    Shift worked:  7A-7P     Shift summary and any significant changes:     Skin documentation as charted.  Wound consult placed.  Venelex and moisture barrier applied.  Replaced back on oxygen due to intermittent hypoxia with turns and rest when removes oxygen.  Confusion at all times.  Multiple loose stools today.  Incontinence of bowel and bladder.  Miralax held this morning. Physical therapy evaluated this shift.  Unable to completed admission database due to confusion.     Concerns for physician to address:  Wound care consult.    Replaced back on oxygen for intermittent

## 2024-01-01 NOTE — PLAN OF CARE
Problem: Physical Therapy - Adult  Goal: By Discharge: Performs mobility at highest level of function for planned discharge setting.  See evaluation for individualized goals.  Description: FUNCTIONAL STATUS PRIOR TO ADMISSION: The patient is unable to provide history due to cognition. Relatively independent with ADLs and gait per chart review at baseline. Per CM note- she has been falling down for the last 2 weeks, and daughter cannot get her up    HOME SUPPORT PRIOR TO ADMISSION: The patient lived with daughters.    Physical Therapy Goals  Initiated 1/1/2024  1.  Patient will move from supine to sit and sit to supine, scoot up and down, and roll side to side in bed with minimal assistance within 7 day(s).    2.  Patient will perform sit to stand with moderate assistance within 7 day(s).  3.  Patient will transfer from bed to chair and chair to bed with moderate assistance using the least restrictive device within 7 day(s).  4.  Patient will ambulate with moderate assistance for 10 feet with the least restrictive device within 7 day(s).     Outcome: Progressing     PHYSICAL THERAPY EVALUATION    Patient: María Nuno (89 y.o. female)  Date: 1/1/2024  Primary Diagnosis: NSTEMI (non-ST elevated myocardial infarction) ([facility]) [I21.4]  Non-ST elevation MI (NSTEMI) ([facility]) [I21.4]  COVID-19 [U07.1]       Precautions: Fall Risk (COVID)                  ASSESSMENT :   DEFICITS/IMPAIRMENTS:   The patient is limited by decreased functional mobility, independence in ADLs, high-level IADLs, strength, activity tolerance, endurance, safety awareness, cognition, command following, attention/concentration, coordination, balance, increased pain levels. Patient oriented to self only at time of eval with oxygen tubing out of nose (questionable pleth at mid 80s to low 90s, O2 replaced), and incontinent of liquid stool upon arrival and during clean up. Rolling in bed for clean up with maximal assist. Patient is unable to provide history

## 2024-01-02 PROCEDURE — 6370000000 HC RX 637 (ALT 250 FOR IP): Performed by: FAMILY MEDICINE

## 2024-01-02 PROCEDURE — 6370000000 HC RX 637 (ALT 250 FOR IP): Performed by: INTERNAL MEDICINE

## 2024-01-02 PROCEDURE — 1100000003 HC PRIVATE W/ TELEMETRY

## 2024-01-02 PROCEDURE — 2580000003 HC RX 258: Performed by: INTERNAL MEDICINE

## 2024-01-02 PROCEDURE — 97535 SELF CARE MNGMENT TRAINING: CPT

## 2024-01-02 PROCEDURE — 97166 OT EVAL MOD COMPLEX 45 MIN: CPT

## 2024-01-02 PROCEDURE — 99233 SBSQ HOSP IP/OBS HIGH 50: CPT | Performed by: INTERNAL MEDICINE

## 2024-01-02 RX ADMIN — Medication: at 22:35

## 2024-01-02 RX ADMIN — GABAPENTIN 300 MG: 300 CAPSULE ORAL at 09:47

## 2024-01-02 RX ADMIN — METOPROLOL SUCCINATE 25 MG: 25 TABLET, EXTENDED RELEASE ORAL at 09:47

## 2024-01-02 RX ADMIN — LEVOTHYROXINE SODIUM 75 MCG: 0.07 TABLET ORAL at 05:20

## 2024-01-02 RX ADMIN — GABAPENTIN 300 MG: 300 CAPSULE ORAL at 22:35

## 2024-01-02 RX ADMIN — ASPIRIN 81 MG: 81 TABLET, CHEWABLE ORAL at 09:47

## 2024-01-02 RX ADMIN — POLYETHYLENE GLYCOL 3350 17 G: 17 POWDER, FOR SOLUTION ORAL at 09:47

## 2024-01-02 RX ADMIN — SODIUM CHLORIDE, PRESERVATIVE FREE 10 ML: 5 INJECTION INTRAVENOUS at 22:36

## 2024-01-02 RX ADMIN — SODIUM CHLORIDE, PRESERVATIVE FREE 10 ML: 5 INJECTION INTRAVENOUS at 09:47

## 2024-01-02 RX ADMIN — Medication: at 09:46

## 2024-01-02 RX ADMIN — ATORVASTATIN CALCIUM 20 MG: 20 TABLET, FILM COATED ORAL at 09:47

## 2024-01-02 ASSESSMENT — PAIN SCALES - GENERAL: PAINLEVEL_OUTOF10: 0

## 2024-01-02 NOTE — PLAN OF CARE
Problem: Occupational Therapy - Adult  Goal: By Discharge: Performs self-care activities at highest level of function for planned discharge setting.  See evaluation for individualized goals.  Description: FUNCTIONAL STATUS PRIOR TO ADMISSION: Prior to recent decline ~2 wks PTA, pt was independent with ADLs and functional mobility. Pt has a rollator, but would furniture surf.   Receives Help From: Family, Active : No     HOME SUPPORT: Patient lived with two dtrs who assisted with IADLs and recently ADLs d/t decline.    Occupational Therapy Goals:  Initiated 1/2/2024  1.  Patient will perform grooming in stance with Supervision within 7 day(s).  2.  Patient will perform toileting with Stand by Assist within 7 day(s).  3.  Patient will perform toilet transfers with Contact Guard Assist within 7 day(s).  4.  Patient will perform functional ambulation household distance with RW and SBA  within 7 day(s).  5.  Patient will perform supine<>sit with Supervision within 7 day(s).    Outcome: Progressing   OCCUPATIONAL THERAPY EVALUATION    Patient: María Nuno (89 y.o. female)  Date: 1/2/2024  Primary Diagnosis: NSTEMI (non-ST elevated myocardial infarction) ([facility]) [I21.4]  Non-ST elevation MI (NSTEMI) ([facility]) [I21.4]  COVID-19 [U07.1]         Precautions: Fall Risk (COVID)                  ASSESSMENT :  The patient is limited by decreased functional mobility, independence in ADLs, strength, activity tolerance, endurance, balance.    Based on the impairments listed above pt is presenting below her baseline, currently requiring up to min assist for functional mobility and SBA-min/mod assist for ADLs. Pt tolerated evaluation well, completed multiple functional transfers and mobilized to/from bathroom, engaged in toileting and grooming in stance on RA, with VSS. Pt presents with decreased activity tolerance and impaired standing balance, requiring extra time for all tasks, but no overt LOB noted. Pt's dtr present, who  regular upper body clothing? []  1 []  2 [x]  3 []  4   5.  Taking care of personal grooming such as brushing teeth? []  1 []  2 []  3 [x]  4   6.  Eating meals? []  1 []  2 []  3 [x]  4   © , Trustees of [facility], under license to [employer]. All rights reserved     Score: 20/24     Interpretation of Tool:  Represents clinically-significant functional categories (i.e. Activities of daily living).    Cutoff score 39.4 (19) correlates to a good likelihood of discharging home versus a facility  Nell Garcia, Melodie Burks, Beto Wise, Gilma Moses, Alexei Jesus, Juan R Garcia, -PAC “6-Clicks” Functional Assessment Scores Predict Acute Care Hospital Discharge Destination, Physical Therapy, Volume 94, Issue 9, 2014, Pages 3422-1311, [email or web address]    Pain Ratin/10   Pain Intervention(s):       Activity Tolerance:   Fair     After treatment:   Patient left in no apparent distress sitting up in chair, Call bell within reach, Bed/ chair alarm activated, and Caregiver / family present    COMMUNICATION/EDUCATION:   The patient's plan of care was discussed with: registered nurse    Patient Education  Education Given To: Family;Patient  Education Provided: Role of Therapy;Transfer Training;Equipment;Plan of Care;Fall Prevention Strategies  Education Method: Verbal;Demonstration  Barriers to Learning: Hearing  Education Outcome: Verbalized understanding;Continued education needed    Thank you for this referral.  Vera Pop OT  Minutes: 34    Occupational Therapy Evaluation Charge Determination   History Examination Decision-Making   MEDIUM Complexity : Expanded review of history including physical, cognitive and psychocial history  MEDIUM Complexity: 3-5 Performance deficits relating to physical, cognitive, or psychosocial skills that result in activity limitations and/or participation restrictions MEDIUM Complexity: Patient may present with  comorbidities that affect occupational performance. Minimal to moderate modifications of tasks or assist (eg. physical or verbal) with assist is necessary to enable pt to complete eval   Based on the above components, the patient evaluation is determined to be of the following complexity level: Medium

## 2024-01-02 NOTE — CARE COORDINATION
Transition of Care Plan:    RUR: 13% (low RUR)  Prior Level of Functioning: Needing assistance  Disposition: HH to be recommended  If SNF or IPR: Date FOC offered: TBD  Date FOC received: TBD  Accepting facility: N/A  Date authorization started with reference number: N/A  Date authorization received and expires: N/A  Follow up appointments: PCP/specialists if needed.  DME needed: TBD  Trans[facility] at discharge: [facility] anticipated.  IM/IMM Medicare/ letter given: 2nd IM needed prior to discharge.  Is patient a  and connected with VA? No.   If yes, was [place] transfer form completed and VA notified? N/A  Caregiver Contact: Denise Lin - [facility] - [phone]  Discharge Caregiver contacted prior to discharge? CM to contact.  Care Conference needed? No.  Barriers to discharge: OT orders likely needed for SNF auth, SNF placement and auth    0947 - MD notified via [employer] message of potential OT needs for SNF auth.    1033 - Message acknowledged.    1500 - Therapy confirmed HH is the new recommendation for patient.      Janice España, SUZEN, RN    Care Management  [phone]

## 2024-01-02 NOTE — PROGRESS NOTES
INTERNAL MEDICINE ATTENDING NOTE     Patient Name: María Nuno   : 1921   Admit: 2023      ASSESSMENT / PLAN    Non-ST elevation MI (NSTEMI) ([facility]): Markedly elevated troponin level with abnormal EKG- diffuse t wave inversion and st depression. Will treat with metoprolol, aspirin and atorvastatin. She is not an appropriate candidate for cardiac cath at this time due to her hx of bleeding disorder and very advanced age and delirium.   Positive covid test with mild hypoxia- currently on nasal oxygen. Was initially started on decadron, but have held this due to her delirium.   Hx of bleeding disorder- Hold lovenox. Mechanical prophylaxis.   Hypothyroidism- Continue synthroid.   Delirium- On low dose ativan for agitation.   Recurrent falls at home the last 2 weeks- daughter has been unable to get her up. That prompted her to come to ER. PT/OT to see. If this problem improved, daughter thinks she could probably take her back home. Will get case management involved. Will likely need short term stay at SNF (vs home PT)  DNR    Iban Cortez MD, FACP  Best contact is via pager by hospital  at [phone].  I am also on [employer].                      SUBJECTIVE:   Ms. María Nuno was seen by me today during rounds. At this time, she is resting comfortably.  The patient has no new complaints today.  She denies SOB. She denies chest tightness. ROS otherwise unremarkable except as noted elsewhere.    OBJECTIVE:   Blood pressure 110/67, pulse 82, temperature 98.1 °F (36.7 °C), temperature source Axillary, resp. rate 18, weight 59.6 kg (131 lb 8 oz), SpO2 93 %.  Gen: Patient is in no acute distress. Lungs: CTAB. Heart: RRR. Abd: S, NT, ND, BS present. Exremities: Warm.     No results found for this or any previous visit (from the past 12 hour(s)).      Iban Cortez MD, FACP  I am available on [employer].

## 2024-01-03 ENCOUNTER — APPOINTMENT (OUTPATIENT)
Facility: HOSPITAL | Age: 89
DRG: 177 | End: 2024-01-03
Payer: MEDICARE

## 2024-01-03 ENCOUNTER — APPOINTMENT (OUTPATIENT)
Facility: HOSPITAL | Age: 89
DRG: 177 | End: 2024-01-03
Attending: FAMILY MEDICINE
Payer: MEDICARE

## 2024-01-03 LAB
ABO + RH BLD: NORMAL
APTT PPP: 27.9 SEC (ref 22.1–31)
BLOOD GROUP ANTIBODIES SERPL: NORMAL
ECHO AV MEAN GRADIENT: 53 MMHG
ECHO AV MEAN VELOCITY: 3.3 M/S
ECHO AV PEAK GRADIENT: 105 MMHG
ECHO AV PEAK VELOCITY: 5.1 M/S
ECHO AV VTI: 94.5 CM
ECHO BSA: 1.65 M2
ECHO EST RA PRESSURE: 8 MMHG
ECHO LA VOL A-L A2C: 63 ML (ref 22–52)
ECHO LA VOL A-L A4C: 78 ML (ref 22–52)
ECHO LA VOL MOD A2C: 57 ML (ref 22–52)
ECHO LA VOL MOD A4C: 72 ML (ref 22–52)
ECHO LA VOLUME AREA LENGTH: 85 ML
ECHO LA VOLUME INDEX A-L A2C: 38 ML/M2 (ref 16–34)
ECHO LA VOLUME INDEX A-L A4C: 47 ML/M2 (ref 16–34)
ECHO LA VOLUME INDEX AREA LENGTH: 52 ML/M2 (ref 16–34)
ECHO LA VOLUME INDEX MOD A2C: 35 ML/M2 (ref 16–34)
ECHO LA VOLUME INDEX MOD A4C: 44 ML/M2 (ref 16–34)
ECHO LV FRACTIONAL SHORTENING: 17 % (ref 28–44)
ECHO LV INTERNAL DIMENSION DIASTOLE INDEX: 1.76 CM/M2
ECHO LV INTERNAL DIMENSION DIASTOLIC: 2.9 CM (ref 3.9–5.3)
ECHO LV INTERNAL DIMENSION SYSTOLIC INDEX: 1.45 CM/M2
ECHO LV INTERNAL DIMENSION SYSTOLIC: 2.4 CM
ECHO LV IVSD: 1.6 CM (ref 0.6–0.9)
ECHO LV MASS 2D: 160 G (ref 67–162)
ECHO LV MASS INDEX 2D: 97 G/M2 (ref 43–95)
ECHO LV POSTERIOR WALL DIASTOLIC: 1.5 CM (ref 0.6–0.9)
ECHO LV RELATIVE WALL THICKNESS RATIO: 1.03
ECHO LVOT AREA: 2.5 CM2
ECHO LVOT DIAM: 1.8 CM
ECHO MV A VELOCITY: 2.07 M/S
ECHO MV E DECELERATION TIME (DT): 293 MS
ECHO MV E VELOCITY: 1.27 M/S
ECHO MV E/A RATIO: 0.61
ECHO MV MAX VELOCITY: 2.3 M/S
ECHO MV MEAN GRADIENT: 10 MMHG
ECHO MV MEAN VELOCITY: 1.4 M/S
ECHO MV PEAK GRADIENT: 22 MMHG
ECHO MV VTI: 46.8 CM
ECHO RIGHT VENTRICULAR SYSTOLIC PRESSURE (RVSP): 53 MMHG
ECHO RV FREE WALL PEAK S': 12 CM/S
ECHO RV TAPSE: 1.8 CM (ref 1.7–?)
ECHO TV REGURGITANT MAX VELOCITY: 3.35 M/S
ECHO TV REGURGITANT PEAK GRADIENT: 45 MMHG
ERYTHROCYTE [DISTWIDTH] IN BLOOD BY AUTOMATED COUNT: 14.2 % (ref 11.5–14.5)
HCT VFR BLD AUTO: 37.2 % (ref 35–47)
HGB BLD-MCNC: 12.5 G/DL (ref 11.5–16)
HGB BLD-MCNC: 9.5 G/DL (ref 11.5–16)
INR PPP: 1 (ref 0.9–1.1)
MCH RBC QN AUTO: 28.5 PG (ref 26–34)
MCHC RBC AUTO-ENTMCNC: 33.6 G/DL (ref 30–36.5)
MCV RBC AUTO: 84.7 FL (ref 80–99)
NRBC # BLD: 0 K/UL (ref 0–0.01)
NRBC BLD-RTO: 0 PER 100 WBC
PLATELET # BLD AUTO: 298 K/UL (ref 150–400)
PMV BLD AUTO: 12.6 FL (ref 8.9–12.9)
PROTHROMBIN TIME: 10.8 SEC (ref 9–11.1)
RBC # BLD AUTO: 4.39 M/UL (ref 3.8–5.2)
SPECIMEN EXP DATE BLD: NORMAL
THERAPEUTIC RANGE: NORMAL SECS (ref 58–77)
WBC # BLD AUTO: 17.4 K/UL (ref 3.6–11)

## 2024-01-03 PROCEDURE — 1100000003 HC PRIVATE W/ TELEMETRY

## 2024-01-03 PROCEDURE — 85610 PROTHROMBIN TIME: CPT

## 2024-01-03 PROCEDURE — 97530 THERAPEUTIC ACTIVITIES: CPT

## 2024-01-03 PROCEDURE — 36415 COLL VENOUS BLD VENIPUNCTURE: CPT

## 2024-01-03 PROCEDURE — 93306 TTE W/DOPPLER COMPLETE: CPT

## 2024-01-03 PROCEDURE — 6370000000 HC RX 637 (ALT 250 FOR IP): Performed by: FAMILY MEDICINE

## 2024-01-03 PROCEDURE — 85730 THROMBOPLASTIN TIME PARTIAL: CPT

## 2024-01-03 PROCEDURE — 97110 THERAPEUTIC EXERCISES: CPT

## 2024-01-03 PROCEDURE — 85018 HEMOGLOBIN: CPT

## 2024-01-03 PROCEDURE — 6370000000 HC RX 637 (ALT 250 FOR IP): Performed by: INTERNAL MEDICINE

## 2024-01-03 PROCEDURE — 99233 SBSQ HOSP IP/OBS HIGH 50: CPT | Performed by: INTERNAL MEDICINE

## 2024-01-03 PROCEDURE — 6360000002 HC RX W HCPCS: Performed by: INTERNAL MEDICINE

## 2024-01-03 PROCEDURE — 85027 COMPLETE CBC AUTOMATED: CPT

## 2024-01-03 PROCEDURE — 2580000003 HC RX 258: Performed by: INTERNAL MEDICINE

## 2024-01-03 PROCEDURE — 86850 RBC ANTIBODY SCREEN: CPT

## 2024-01-03 PROCEDURE — 86901 BLOOD TYPING SEROLOGIC RH(D): CPT

## 2024-01-03 PROCEDURE — 86900 BLOOD TYPING SEROLOGIC ABO: CPT

## 2024-01-03 PROCEDURE — 6360000004 HC RX CONTRAST MEDICATION: Performed by: INTERNAL MEDICINE

## 2024-01-03 PROCEDURE — 74178 CT ABD&PLV WO CNTR FLWD CNTR: CPT

## 2024-01-03 RX ORDER — SODIUM CHLORIDE 9 MG/ML
INJECTION, SOLUTION INTRAVENOUS CONTINUOUS
Status: DISCONTINUED | OUTPATIENT
Start: 2024-01-03 | End: 2024-01-04 | Stop reason: HOSPADM

## 2024-01-03 RX ORDER — MORPHINE SULFATE 2 MG/ML
2 INJECTION, SOLUTION INTRAMUSCULAR; INTRAVENOUS ONCE
Status: COMPLETED | OUTPATIENT
Start: 2024-01-03 | End: 2024-01-03

## 2024-01-03 RX ORDER — 0.9 % SODIUM CHLORIDE 0.9 %
250 INTRAVENOUS SOLUTION INTRAVENOUS ONCE
Status: COMPLETED | OUTPATIENT
Start: 2024-01-03 | End: 2024-01-03

## 2024-01-03 RX ORDER — MORPHINE SULFATE 2 MG/ML
2 INJECTION, SOLUTION INTRAMUSCULAR; INTRAVENOUS
Status: DISCONTINUED | OUTPATIENT
Start: 2024-01-03 | End: 2024-01-04 | Stop reason: HOSPADM

## 2024-01-03 RX ADMIN — SODIUM CHLORIDE, PRESERVATIVE FREE 10 ML: 5 INJECTION INTRAVENOUS at 09:21

## 2024-01-03 RX ADMIN — Medication: at 22:02

## 2024-01-03 RX ADMIN — SODIUM CHLORIDE, PRESERVATIVE FREE 10 ML: 5 INJECTION INTRAVENOUS at 22:02

## 2024-01-03 RX ADMIN — MORPHINE SULFATE 2 MG: 2 INJECTION, SOLUTION INTRAMUSCULAR; INTRAVENOUS at 19:10

## 2024-01-03 RX ADMIN — MORPHINE SULFATE 2 MG: 2 INJECTION, SOLUTION INTRAMUSCULAR; INTRAVENOUS at 18:56

## 2024-01-03 RX ADMIN — ATORVASTATIN CALCIUM 20 MG: 20 TABLET, FILM COATED ORAL at 09:20

## 2024-01-03 RX ADMIN — IOPAMIDOL 100 ML: 755 INJECTION, SOLUTION INTRAVENOUS at 18:15

## 2024-01-03 RX ADMIN — MORPHINE SULFATE 2 MG: 2 INJECTION, SOLUTION INTRAMUSCULAR; INTRAVENOUS at 19:42

## 2024-01-03 RX ADMIN — Medication: at 09:20

## 2024-01-03 RX ADMIN — LEVOTHYROXINE SODIUM 75 MCG: 0.07 TABLET ORAL at 10:33

## 2024-01-03 RX ADMIN — ASPIRIN 81 MG: 81 TABLET, CHEWABLE ORAL at 09:20

## 2024-01-03 RX ADMIN — GABAPENTIN 300 MG: 300 CAPSULE ORAL at 09:20

## 2024-01-03 RX ADMIN — METOPROLOL SUCCINATE 25 MG: 25 TABLET, EXTENDED RELEASE ORAL at 09:21

## 2024-01-03 RX ADMIN — POLYETHYLENE GLYCOL 3350 17 G: 17 POWDER, FOR SOLUTION ORAL at 09:20

## 2024-01-03 RX ADMIN — MORPHINE SULFATE 2 MG: 2 INJECTION, SOLUTION INTRAMUSCULAR; INTRAVENOUS at 20:21

## 2024-01-03 RX ADMIN — MORPHINE SULFATE 2 MG: 2 INJECTION, SOLUTION INTRAMUSCULAR; INTRAVENOUS at 21:00

## 2024-01-03 RX ADMIN — SODIUM CHLORIDE 250 ML: 9 INJECTION, SOLUTION INTRAVENOUS at 17:47

## 2024-01-03 ASSESSMENT — PAIN SCALES - GENERAL
PAINLEVEL_OUTOF10: 0
PAINLEVEL_OUTOF10: 0

## 2024-01-03 NOTE — PROGRESS NOTES
1825- Dr. Younger notified patient back from CT. Primary RN reports large output of bloody stool.   Ordered to repeat H/H now and start IV fluids at 100ml/hr. Continue to check h/h every 4 hours.     Family requests update from MD team. Message relayed to Dr. Younger.

## 2024-01-03 NOTE — PROGRESS NOTES
INTERNAL MEDICINE ATTENDING NOTE     Patient Name: María Nuno   : 1921   Admit: 2023      ASSESSMENT / PLAN    Non-ST elevation MI (NSTEMI) ([facility]): Markedly elevated troponin level with abnormal EKG- diffuse t wave inversion and st depression. Will treat with metoprolol, aspirin and atorvastatin. She is not an appropriate candidate for cardiac cath at this time due to her hx of bleeding disorder and very advanced age and delirium.   Positive covid test with mild hypoxia- currently on nasal oxygen. Was initially started on decadron, but have held this due to her delirium.   Hx of bleeding disorder- Hold lovenox. Mechanical prophylaxis.   Hypothyroidism- Continue synthroid.   Delirium- On low dose ativan for agitation.   Recurrent falls at home the last 2 weeks- daughter has been unable to get her up.   DNR.  Discharge planning: HH vs. SNF, possibly tomorrow.     Spoke with daughter, Denise, as well as another family member, Olga, regarding case. Olga is upset that food trays aren't being sent in immediately, and \"they aren't taking care of her; I want her out of that hospital as soon as possible.\" Regarding discharge, Denise has preference for SNF, Olga prefers HH.  I advised them to discuss with Case Management.      Iban Cortez MD, FACP  Best contact is via pager by hospital  at [phone].  I am also on [employer].                      SUBJECTIVE:   Ms. María Nuno was seen by me today during rounds. At this time, she is resting comfortably.  The patient has no new complaints today.  She denies SOB. She denies chest tightness. ROS otherwise unremarkable except as noted elsewhere.    OBJECTIVE:   Blood pressure (!) 119/59, pulse 74, temperature 97.6 °F (36.4 °C), temperature source Oral, resp. rate 24, weight 59.6 kg (131 lb 8 oz), SpO2 95 %.  Gen: Patient is in no acute distress. Lungs: CTAB. Heart: RRR. Abd: S, NT, ND, BS present. Exremities: Warm.     No results found for this or  any previous visit (from the past 12 hour(s)).      Iban Cortez MD, FACP  I am available on [employer].

## 2024-01-03 NOTE — WOUND CARE
Wound care consult for the purple discoloration on the buttocks and other areas of her skin in the lower torso.  Pt. Has Covid 19 virus and is on Droplet precautions. She is 89 years old.  Pt. Is incontinent of urine and stool.   Assessment of the skin:  the buttocks medially and the perineum is hyperpigmented from chronic moisture. The area of concern for a DTI is actually blanchable and is not a DTI. This was present on admission.   Pt. Also has red heels that are completely blanchable. They are not pressure injuries. The heels are at risk of pressure injury due to immobility.     Folds are red / moist. Need zinc oxide cream:               This purple spot nel:    the sacrum redness is now gone      The skin is blanchable.         Heels red / blanching:       Treatment: walked in as the staff were cleaning her up from a BM. Treated with the Balsam of [place] ointment (Venelex) after cleansing. Also applied the ointment to the heels. Off loading with pillows.   Plan: Pressure injury prevention with turning from side to side and floating the heels. Encourage chair sitting for meals if possible.   Attend to incontinence in a timely manner.   Assist with her meals - set up and seating for meals.   Pearl Umaña, RN, BSN, CWON

## 2024-01-03 NOTE — PROGRESS NOTES
1726: RRT called for hypotension. [facility] nursing manager, CCL Ida, and CCU Charge nurse at the bedside.     1727: VS as followed: BP 97/51, HR 73, RR 32, and oxygen 92% on 6 liters. Pt. Is less responsive than baseline p er Primary nurse. Pt. Just now had a very large bright red bloody bowel movement.      Dr. Younger at the bedside to assess.     1735: CBC, PT/INR, STAT CT, 250 ml NS bolus, and Type and screen now ordered per Dr. Younger. Will complete all STAT.     1800: CCU team consulted. Dr. Zamudio at the bedside.     1811: CT completed.     1830: Pt. Had another bloody bowel movement, very large. Dr. Zamudio and Dr. Younger aware. At this time, Per Dr. Zamudio, keep patient in current room as VS are stable at this time. /45, HR 78, RR 30.     Addendum:     1841: RRT called again for severe hypotension and another very large bloody bowel movement. Dr. Zamudio and Dr. Younger returned to the room with RRT nurse.  After a discussion, Family opted for comfort measures only. Pt. Left in primary nurse's care.

## 2024-01-03 NOTE — CARE COORDINATION
Transition of Care Plan:     RUR: 13% (low RUR)  Prior Level of Functioning: Needing assistance  Disposition: [facility] (auth pending)  If SNF or IPR: Date FOC offered: TBD  Date FOC received: TBD  Accepting facility: N/A  Date authorization started with reference number: N/A  Date authorization received and expires: N/A  Follow up appointments: PCP/specialists if needed.  DME needed: TBD  Transportation at discharge: [facility] anticipated.  IM/IMM Medicare/ letter given: 2nd IM needed prior to discharge.  Is patient a  and connected with VA? No.              If yes, was  transfer form completed and VA notified? N/A  Caregiver Contact: Denise Lin - DTR - [phone]  Discharge Caregiver contacted prior to discharge? CM to contact.  Care Conference needed? No.  Barriers to discharge: OT orders likely needed for SNF auth, SNF placement and auth        2:48 p.m. [facility] accepted pt.  CM called pt's daughter Sierra who reported she talked to her sister and they are in agreement with moving forward with [facility].  CM notified Luis Miguel of [facility] to start auth.    1:38 p.m. CM called Sierra to discuss d/c plans.  Sierra and her sister Denise are still trying to decide between SNF vs HH at d/c.  CM encourage them to talk and make a decision.  CM offered to send referrals to SNF and HH.  Sierra in agreement and wants referrals sent to [facility] and [facility] after talking with Dr. Cortez.  CM will send.  Sierra does not have a preference for HH. FOC offered.  CM will send referral.     CM informed by OT that pt's daughter Sierra would like to speak to .  CM called Sierra at [phone] and was informed that pt is on the phone with Dr. Cortez.  CM will call back.    Darlyn Fuentes, LMSW  Supervisee in Social Work  Care Management, [facility]  [phone]

## 2024-01-03 NOTE — PROGRESS NOTES
6407- Patient had 6 runs of V- tach then another 5 runs about 10minutes apart. Patient is aymptomatic and 12lead EKG is showing NSR. Patient denies any discomfort. MD notified via [employer]. Will continue to monitor patient. VSS

## 2024-01-03 NOTE — PROGRESS NOTES
Called by hospitalist to eval Ms Nuno.  She is a 89 female who presented with falls.  Per her primary Dr Cortez, she has \"history of bleeding disorder\".  She developed an acute GIB and has had large volume melena.  Per Dr. Younger, he has ordered CTA.  Recommended peripheral Raymon to support BP thorough eval.  Call IR to have them emergently eval for embolization    Went to eval patient but she was in CT.  Discussed with her family extensively their  wishes.  They stated multiple times that the patient wanted to die at home and insisted that she not have resuscitation.   They are leaning away from any invasive measures if there is not something accessible for emobolization.  Strongly recommend comfort measures at this point if blood loss is as extensive as described.    Updated nursing staff and Dr. Carisa Zamudio MD [facility] Critical Care  [phone]    Time spent on ACP >20 min     Addendum 9153 - responded to rapid call. Pt blood pressure 60s/40 and pt was non responsive.  Family all agreed that the did not want her to suffer.  They believed that she was ready to die and was \"tired\".  They wanted to transition to comfort measures.  Fluids stopped.  Family bedside with pt. Hospital medicine bedside and agrees with plan.

## 2024-01-03 NOTE — PLAN OF CARE
Problem: Physical Therapy - Adult  Goal: By Discharge: Performs mobility at highest level of function for planned discharge setting.  See evaluation for individualized goals.  Description: FUNCTIONAL STATUS PRIOR TO ADMISSION: The patient is unable to provide history due to cognition. Relatively independent with ADLs and gait per chart review at baseline. Per CM note- she has been falling down for the last 2 weeks, and daughter cannot get her up    HOME SUPPORT PRIOR TO ADMISSION: The patient lived with daughters.    Physical Therapy Goals  Initiated 1/1/2024  1.  Patient will move from supine to sit and sit to supine, scoot up and down, and roll side to side in bed with minimal assistance within 7 day(s).    2.  Patient will perform sit to stand with moderate assistance within 7 day(s).  3.  Patient will transfer from bed to chair and chair to bed with moderate assistance using the least restrictive device within 7 day(s).  4.  Patient will ambulate with moderate assistance for 10 feet with the least restrictive device within 7 day(s).     Outcome: Progressing     PHYSICAL THERAPY TREATMENT    Patient: María Nuno (89 y.o. female)  Date: 1/3/2024  Diagnosis: NSTEMI (non-ST elevated myocardial infarction) ([facility]) [I21.4]  Non-ST elevation MI (NSTEMI) ([facility]) [I21.4]  COVID-19 [U07.1] Non-ST elevation MI (NSTEMI) ([facility])      Precautions: Fall Risk (COVID)                    ASSESSMENT:  Patient continues to benefit from skilled PT services and is slowly progressing towards goals. Patient encountered seated in bedside chair in NAD, cleared by RN to participate in therapy. Pt performed seated therex (kicks, marches, glute sets) with good tolerance, intermittent cueing for PLB d/t increased WOB. Pt additionally performed sit <> stand transfer from bedside chair x3 trials with Min-Mod A. Pt noted with posterior trunk lean initially, benefited from tactile cues for anterior weight shift over COG. Pt with episode of bowel  incontinence on 2nd standing trial, maintained standing with mod A for toileting hygiene. Pt reported fatigue at end of session.          PLAN:  Patient continues to benefit from skilled intervention to address the above impairments.  Continue treatment per established plan of care.    Recommendation for discharge: (in order for the patient to meet his/her long term goals): Continue to assess pending progress. Pt would benefit from SNF upon discharge, per daughter-in-law, pt may not be amenable and family would like to discuss options with case management. If pt were to go home, would require HHPT, 24h assist, 1-person assist for all aspects of mobility. Daughter-in-law reports multiple family members have COVID, are elderly, or physically unable to assist patient at this time. RN and  made aware     Other factors to consider for discharge: patient's current support system is unable to meet their requirements for physical assistance, poor safety awareness, impaired cognition, high risk for falls, not safe to be alone, and concern for safely navigating or managing the home environment    IF patient discharges home will need the following DME: continuing to assess with progress; may benefit from RW and bedside commode        SUBJECTIVE:   Patient stated, \"Hi.\"    OBJECTIVE DATA SUMMARY:   Critical Behavior:  Orientation  Orientation Level: Oriented to person;Oriented to place;Oriented to time (hospital/year with choices)  Cognition  Arousal/Alertness: Appropriate responses to stimuli  Following Commands: Follows one step commands consistently  Attention Span: Appears intact  Insights: Fully aware of deficits  Initiation: Requires cues for some    Functional Mobility Training:  Bed Mobility:  Bed Mobility Training  Bed Mobility Training: No  Transfers:  Transfer Training  Transfer Training: Yes  Interventions: Verbal cues;Tactile cues  Sit to Stand: Minimum assistance;Moderate assistance (cueing for hand

## 2024-01-04 VITALS
OXYGEN SATURATION: 98 % | TEMPERATURE: 97.8 F | WEIGHT: 131.39 LBS | DIASTOLIC BLOOD PRESSURE: 52 MMHG | BODY MASS INDEX: 21.89 KG/M2 | SYSTOLIC BLOOD PRESSURE: 104 MMHG | HEIGHT: 65 IN

## 2024-01-04 NOTE — CARE COORDINATION
4108 -  received call from [facility] (DTR) regarding not wanting the patient to got to [facility] and potentially being willing to take the patient home with CGs.  MIKE contacted [facility] and left  saying that authorization has been started for [facility] and if they wish to take patient home, insurance may or may not cover the cost of CGs at home.  MIKE offered to provide CG resources and left  callback number.    6728 -  updated that patient has passed away.      Janice España, SUZEN, RN    Care Management  [phone]

## 2024-01-04 NOTE — PROGRESS NOTES
Physician Pronouncement of Death    Called by nursing to pronounce patient.  Death expected: YES  Family informed: YES, per nursing present at the bedside, just left prior to my arrival to pronounce pt.    Physical Exam:  No corneal reflex.  No gag reflex.  No heart sounds on auscultation.  No spontaneous breath sounds.  No withdrawal from painful stimuli.    Time of Death: 0345    Signed: CAMILO Butt - CNP  1/4/2024 3:45 AM    Death summary and discharge to be completed by primary attending Dr Cortez. Nursing aware to notify his group in the AM.

## 2024-01-04 NOTE — PROGRESS NOTES
1720- Patient's daughter called this RN's attention to patient while this RN was in another patient's room. On arrival to María's room, patient appeared unresponsive to both verbal and physical stimulation. Patient had had a GI bleed/melena stool with some clots on assessment. Rapid response called. Please refer to RRT note. Patient had a total of 2 rapid response called, had 3 episodes of large melena with clots, had a stat CTA and fluids per RRT note. Patient made comfort care per 1850.

## 2024-01-04 NOTE — PROGRESS NOTES
Spiritual Care Assessment/Progress Note  [facility]    Name: María Nuno MRN: [id number]    Age: 89 y.o.     Sex: female   Language: English     Date: 1/3/2024            Total Time Calculated: 33 min              Spiritual Assessment begun in MRM 2 CARDIAC MEDICAL STEP DOWN  Service Provided For:: Patient and family together  Referral/Consult From:: Nurse  Encounter Overview/Reason : Grief, Loss, and Adjustments, Spiritual/Emotional Needs    Spiritual beliefs:      [x] Involved in a jennifer tradition/spiritual practice: Pentecostal     [] Supported by a jennifer community:      [] Claims no spiritual orientation:      [] Seeking spiritual identity:           [] Adheres to an individual form of spirituality:      [] Not able to assess:                Identified resources for coping and support system:   Support System: Children, Family members       [x] Prayer                  [] Devotional reading               [] Music                  [] Guided Imagery     [] Pet visits                                        [] Other: (COMMENT)     Specific area/focus of visit   Encounter:    Crisis:    Spiritual/Emotional needs: Type: Spiritual Support, Emotional Distress  Ritual, Rites and Sacraments:    Grief, Loss, and Adjustments: Type: End of Life, Anticipatory Grief  Ethics/Mediation:    Behavioral Health:    Palliative Care:    Advance Care Planning:      Plan/Referrals: Continue Support (comment)    Narrative:    Responded to page to CMSD unit for patient who is at end of life; family has transitioned patient to comfort measures.  Several family members at bedside supporting patient and one another.  Daughter shared that patient's back has been bothering her; she seems restless.  Son shared she is Pentecostal; attended [employer] most of her life.  Family declined offer to attempt contacting a .  Provided pastoral presence and emotional support.  Spoke words of reassurance

## 2024-01-04 NOTE — PROGRESS NOTES
1900 Bedside and Verbal shift change report given to Andrés RN (oncoming nurse) by Jovanni RN (offgoing nurse). Report included the following information Nurse Handoff Report, MAR, Recent Results, and Cardiac Rhythm NSTEMI . Bloody stool noted (3rd time since 5pm as per morning shift nurse)    On comfort measures, Oxygen cannula off and family members at bedside. Confirmed with Ida RN that vitals, blood collection and due meds is not needed for the patient case since pt is on comfort measures.     1940 lidia Bender visited patient and talked with family members.     2107 Pt relative requested Ativan in IV form for pt restlessness. Message Dr. Cortez but covered by Dr. Ryan(Message sent to to him).     2135 Above request and information forwarded to Karla Hospitalist. Also inform of the pt relative's for pt to be seen by a doctor/hospitalist.     2223 Informed by Karla Layton Hospitalist that pt is under Dr. Cortez and to contact for further management.     0334 Pt asystole on the tele monitor, check for pt pulse and breathing (none noted). Pt relatives on bed side when this occurs. All of the pt's belongings was collected by SON (Giovani Nuno - Phone: [phone])    0339 Informed hospitalist about the passing of patient and request for pronouncement of date and time of death.    0344 Nursing Supervisor Nadia informed of patient passing.    0347 Called [employer] and information requested given with Reference number of [id number].    0350 Karla Arreola Hospitalist came to bedside and pronounce that time of death is 0345 and date is 01/04/2024.    0355 Post Mortem care done and place pt inside the body bag    0400 Nursing Supervisor Nadia got a call from [employer] about the release of body.    0430 Postmortem checklist filled up.    0456 lidia Mon was called to notifiy about pt death.    0500 Nursing Supervisor Nadia collected pt chart with death certificate paper.    0530 Informed Nursing Supervisor

## 2024-01-04 NOTE — PLAN OF CARE
Problem: Safety - Adult  Goal: Free from fall injury  Outcome: Progressing     Problem: Respiratory - Adult  Goal: Achieves optimal ventilation and oxygenation  Outcome: Progressing     Problem: Skin/Tissue Integrity - Adult  Goal: Skin integrity remains intact  Outcome: Progressing     Problem: Infection - Adult  Goal: Absence of infection at discharge  Outcome: Progressing     Problem: Discharge Planning  Goal: Discharge to home or other facility with appropriate resources  Outcome: Progressing     Problem: Pain  Goal: Verbalizes/displays adequate comfort level or baseline comfort level  Outcome: Progressing     Problem: Skin/Tissue Integrity  Goal: Absence of new skin breakdown  Description: 1.  Monitor for areas of redness and/or skin breakdown  2.  Assess vascular access sites hourly  3.  Every 4-6 hours minimum:  Change oxygen saturation probe site  4.  Every 4-6 hours:  If on nasal continuous positive airway pressure, respiratory therapy assess nares and determine need for appliance change or resting period.  Outcome: Progressing     Problem: Physical Therapy - Adult  Goal: By Discharge: Performs mobility at highest level of function for planned discharge setting.  See evaluation for individualized goals.  Description: FUNCTIONAL STATUS PRIOR TO ADMISSION: The patient is unable to provide history due to cognition. Relatively independent with ADLs and gait per chart review at baseline. Per CM note- she has been falling down for the last 2 weeks, and daughter cannot get her up    HOME SUPPORT PRIOR TO ADMISSION: The patient lived with daughters.    Physical Therapy Goals  Initiated 1/1/2024  1.  Patient will move from supine to sit and sit to supine, scoot up and down, and roll side to side in bed with minimal assistance within 7 day(s).    2.  Patient will perform sit to stand with moderate assistance within 7 day(s).  3.  Patient will transfer from bed to chair and chair to bed with moderate assistance

## 2024-01-04 NOTE — DISCHARGE SUMMARY
Physician Discharge Summary     Patient ID:  María Nuno  [id number]  89 y.o.  4/2/1921    Admit date: 12/29/2023    Discharge date: 1/4/2024    Admission Diagnoses: NSTEMI (non-ST elevated myocardial infarction) ([facility]) [I21.4]  Non-ST elevation MI (NSTEMI) ([facility]) [I21.4]  COVID-19 [U07.1]    Discharge Diagnoses:  Principal Diagnosis GI bleed due to diverticulosis.   Non-ST elevation MI (NSTEMI) (HCC)   COVID-19  Delirium  History of bleeding disorder  Hypothyroidism     Consults: Critical care.     Significant Diagnostic Studies:     CTA chest:   No pulmonary embolus or other acute cardiopulmonary process.     CT Abd Pelvis:   Active bleeding in the left colon likely diverticular.     HPI (excerpted from Dr. Ruano's note);   María Nuno is a 89 y.o.  female      Remote breast cancer, diabetes, hypertension     Presents with 3 days of generalized weakness, loss of appetite, reduced ambulation  Patient typically able to ambulate at home  She lives with her daughter  Daughter and others in the house has been sick with COVID-19 this week.    Patient has lost her strength and has not been able to walk on her own  lost control of her bowel and bladder.    Not been eating or drinking very well and family concerned about her wellbeing.    They mention a mild cough   Patient denies any pain.      Patient minimized symptoms when I saw  \"I feel great\"  Asking if she can go home today  Denies shortness of breath although she is currently on 2 L  Denied cough, fevers, sweats  Denies chest pain or abdominal pain, nausea vomiting or diarrhea     ER  Initial sats 98% on room air subsequent drop to 90%, placed on 2 L  Sodium 136  Creatinine 1.24  WBC 7.0  HS troponin 6073  pCXR IMPRESSION:  Mild left basilar atelectasis.   CTA chest IMPRESSION:  No pulmonary embolus or other acute cardiopulmonary process.  No ASD    Hospital Course: Ms. María Nuno is a patient of mine who presented with the problems above.  See the  initial H and P for full details.     She was admitted initially for Non-ST elevation MI (NSTEMI) ([facility]): Records at time of admission show markedly elevated troponin level with abnormal EKG revealing diffuse T wave inversion and ST depression. She was treated with metoprolol, aspirin and atorvastatin. She was deemed not to be an appropriate candidate for cardiac cath at this time due to her history of bleeding disorder and very advanced age and delirium.     She has been noted to have a history of coagulation disorder with easy bleeding, and because of this we held off on giving her lovenox or other anticoagulants.     At the time of admission, she had a positive COVID test with mild hypoxia, and was placed on nasal oxygen. She was also initially started on decadron, but this caused her to have delirium, which improved with discontinuation. She was given low dose ativan for agitation.      She seemed to be clinically improved, subjectively feeling better, with no chest pain or dyspnea, and as of the morning of 1/3, was hemodynamically stable. I had contacted family to discuss discharge planning, and had begun looking into the option of SNF for inpatient PT/OT.     However, later in the day she abruptly developed onset of copious GI bleeding. A rapid response call resulted in CT scan which revealed an active site of bleeding in the left colon likely diverticular. Her BP dropped to 60/40 and she became unresponsive.  The critical care MD discussed the situation with the family: \"Family all agreed that the did not want her to suffer.  They believed that she was ready to die and was \"tired\".  They wanted to transition to comfort measures.  Fluids stopped.\"     Based on this discussion, she was made comfort care. She passed away peacefully this morning at 0345.     Discharge Exam:  See death pronouncement note.     Disposition: .     Cause of death: GI bleed from Diverticulosis      Signed:  Iban Cortez

## 2024-01-04 NOTE — PROGRESS NOTES
Responded to page at 4:54 am to CMSD unit.  ANIA Bowen notified  that patient is .  No family present as they left soon after patient .      PHUONG Alvarenga, [facility], Staff [facility]     Paging Service  [phone])

## 2024-01-04 NOTE — PROGRESS NOTES
Per night RN post-mortem care has been completed, RN took paperwork up to supervisor, just waiting for pt to be transported to [facility].

## 2024-01-10 NOTE — PROGRESS NOTES
Physician Progress Note      PATIENT:               AMADOR GROVES  CSN #:                  [id number]  :                       1921  ADMIT DATE:       2023 7:41 PM  DISCH DATE:        2024 3:45 AM  RESPONDING  PROVIDER #:        Iban Cortez MD          QUERY TEXT:    89yoF pt admitted with NSTEMI and COVID-19 infection POA.  Initially noted to   have Borderline hypoxia sats at 90% on RA on admission.  On 1/3 RRT called   for hypotension w/ RR 32 and on O2 6L 95% sats.  CT imaging revealed an active   site of bleeding to her left colon likely diverticular.' If possible, please   document in the progress notes and discharge summary if you are evaluating   and/or treating any of the following:    The medical record reflects the following:  Risk Factors: 89yrs old, COVID-19 infection, NSTEMI  Clinical Indicators:  ED  - Pulmonary:  Effort: No respiratory distress.  Breath sounds: No wheezing or rhonchi.  Comments: Mild increased respiratory effort, O2 sats 90 to 93% on room air    H&P:  Borderline hypoxia sats at 90% on room air POA      85% RA RR 42 -->   88% on RA , 94% on 3L NC  Tachypneic    RN:   Patient placed back on oxygen 3 liters for hypoxia during turning   for incontience care oxygen dropped to 80s.  Patient intermittently hypoxia   <90% when removes her oxygen but back >90% when oxygen replaced.  Confusion at   all times.   PT eval:  Patient oriented to self only at time of eval with oxygen tubing   out of nose (questionable pleth at mid 80s to low 90s, O2 replaced), and   incontinent of liquid stool upon arrival and during clean up  1/3  1213  FS 82% on RA RR 29  1/3 PT :  Pt performed seated therex (kicks, marches, glute sets) with good   tolerance, intermittent cueing for PLB d/t increased WOB. Pt additionally   performed sit <> stand transfer from bedside chair x3 trials with Min-Mod A.  Treatment: O2 support, morphine, ativan IV, and made comfort care.    Thank  you,  Tosha Hubbard RN, CDI  Options provided:  -- Acute respiratory failure with hypoxia  -- Acute respiratory distress  -- Other - I will add my own diagnosis  -- Disagree - Not applicable / Not valid  -- Disagree - Clinically unable to determine / Unknown  -- Refer to Clinical Documentation Reviewer    PROVIDER RESPONSE TEXT:    This patient is in acute respiratory failure with hypoxia.in the setting of   COVID-19 and hypotension due to diverticular bleed    Query created by: Tosha Hubbard on 1/9/2024 12:08 PM      Electronically signed by:  Iban Cortez MD 1/10/2024 8:53 AM

## 2024-01-15 NOTE — PROGRESS NOTES
Physician Progress Note      PATIENT:               AMADOR GROVES  CSN #:                  [id number]  :                       1921  ADMIT DATE:       2023 7:41 PM  DISCH DATE:        2024 3:45 AM  RESPONDING  PROVIDER #:        Iban Cortez MD          QUERY TEXT:    89 yoF patient admitted with COVID-19 infection and NSTEMI. Pt had markedly   elevated troponin level with abnormal EKG - diffuse twave inversion and st   depression.  Pt did not deem to be candidate for invasive workup d/t age and   hx of bleeding disorder. Pt was treated with ASA and statin.  If possible,   please clarify the type of MI:    The medical record reflects the following:  Risk Factors: advanced age 89F, hx Arrhythmia, COVID-19 infection, hypoxia  Clinical Indicators: Presents with 3 days of generalized weakness, loss of   appetite, reduced ambulation; in ED pt noted with ischemic appearing EKG,   troponin was added on, this is comes back significantly elevated at greater   than 6000. Denies chest pain.  Notable labs:  HS Trop of 6,073 -> 2,073  hgb 12 -> 9.5  Treatment: ASA, Metoprolol, atorvastatin, trend troponin, supplemental O2.    Thank you,  Tosha Hubbard RN, CDI  Options provided:  -- NSTEMI  -- Type 2 MI  -- Demand Ischemia with MI  -- Other - I will add my own diagnosis  -- Disagree - Not applicable / Not valid  -- Disagree - Clinically unable to determine / Unknown  -- Refer to Clinical Documentation Reviewer    PROVIDER RESPONSE TEXT:    This patient has demand ischemia with an MI.    Query created by: Tosha Hubbard on 1/15/2024 2:39 PM      Electronically signed by:  Iban Cortez MD 1/15/2024 2:46 PM